# Patient Record
Sex: FEMALE | Race: WHITE | Employment: UNEMPLOYED | URBAN - METROPOLITAN AREA
[De-identification: names, ages, dates, MRNs, and addresses within clinical notes are randomized per-mention and may not be internally consistent; named-entity substitution may affect disease eponyms.]

---

## 2017-03-14 ENCOUNTER — HOSPITAL ENCOUNTER (OUTPATIENT)
Dept: LAB | Age: 32
Discharge: HOME | End: 2017-03-14
Payer: OTHER GOVERNMENT

## 2017-03-14 PROCEDURE — 87624 HPV HI-RISK TYP POOLED RSLT: CPT | Performed by: ADVANCED PRACTICE MIDWIFE

## 2017-03-14 PROCEDURE — 88175 CYTOPATH C/V AUTO FLUID REDO: CPT | Performed by: ADVANCED PRACTICE MIDWIFE

## 2017-05-31 ENCOUNTER — APPOINTMENT (OUTPATIENT)
Dept: GENERAL RADIOLOGY | Age: 32
End: 2017-05-31
Attending: EMERGENCY MEDICINE
Payer: OTHER GOVERNMENT

## 2017-05-31 ENCOUNTER — HOSPITAL ENCOUNTER (EMERGENCY)
Age: 32
Discharge: HOME | End: 2017-05-31
Attending: EMERGENCY MEDICINE | Admitting: EMERGENCY MEDICINE
Payer: OTHER GOVERNMENT

## 2017-05-31 VITALS
SYSTOLIC BLOOD PRESSURE: 113 MMHG | DIASTOLIC BLOOD PRESSURE: 77 MMHG | WEIGHT: 108 LBS | HEART RATE: 79 BPM | RESPIRATION RATE: 21 BRPM | TEMPERATURE: 99 F | HEIGHT: 68 IN | BODY MASS INDEX: 16.37 KG/M2 | OXYGEN SATURATION: 100 %

## 2017-05-31 DIAGNOSIS — R00.0 TACHYCARDIA: ICD-10-CM

## 2017-05-31 DIAGNOSIS — R50.81 FEVER IN OTHER DISEASES: ICD-10-CM

## 2017-05-31 DIAGNOSIS — N39.0 ACUTE UTI (URINARY TRACT INFECTION): Primary | ICD-10-CM

## 2017-05-31 LAB
ALBUMIN SERPL BCP-MCNC: 4.2 G/DL (ref 3.4–5)
ALBUMIN/GLOB SERPL: 1.3 {RATIO} (ref 0.8–1.7)
ALP SERPL-CCNC: 65 U/L (ref 45–117)
ALT SERPL-CCNC: 19 U/L (ref 13–56)
ANION GAP BLD CALC-SCNC: 8 MMOL/L (ref 3–18)
APPEARANCE UR: ABNORMAL
AST SERPL W P-5'-P-CCNC: 14 U/L (ref 15–37)
BACTERIA URNS QL MICRO: ABNORMAL /HPF
BASOPHILS # BLD AUTO: 0 K/UL (ref 0–0.06)
BASOPHILS # BLD: 0 % (ref 0–2)
BILIRUB SERPL-MCNC: 0.5 MG/DL (ref 0.2–1)
BILIRUB UR QL: NEGATIVE
BUN SERPL-MCNC: 9 MG/DL (ref 7–18)
BUN/CREAT SERPL: 12 (ref 12–20)
CALCIUM SERPL-MCNC: 9.1 MG/DL (ref 8.5–10.1)
CHLORIDE SERPL-SCNC: 105 MMOL/L (ref 100–108)
CO2 SERPL-SCNC: 25 MMOL/L (ref 21–32)
COLOR UR: YELLOW
CREAT SERPL-MCNC: 0.78 MG/DL (ref 0.6–1.3)
DIFFERENTIAL METHOD BLD: ABNORMAL
EOSINOPHIL # BLD: 0 K/UL (ref 0–0.4)
EOSINOPHIL NFR BLD: 0 % (ref 0–5)
EPITH CASTS URNS QL MICRO: ABNORMAL /LPF (ref 0–5)
ERYTHROCYTE [DISTWIDTH] IN BLOOD BY AUTOMATED COUNT: 13.4 % (ref 11.6–14.5)
GLOBULIN SER CALC-MCNC: 3.3 G/DL (ref 2–4)
GLUCOSE SERPL-MCNC: 88 MG/DL (ref 74–99)
GLUCOSE UR STRIP.AUTO-MCNC: NEGATIVE MG/DL
HCG UR QL: POSITIVE
HCT VFR BLD AUTO: 34.5 % (ref 35–45)
HGB BLD-MCNC: 11.2 G/DL (ref 12–16)
HGB UR QL STRIP: ABNORMAL
KETONES UR QL STRIP.AUTO: 15 MG/DL
LACTATE BLD-SCNC: 1.1 MMOL/L (ref 0.4–2)
LEUKOCYTE ESTERASE UR QL STRIP.AUTO: ABNORMAL
LYMPHOCYTES # BLD AUTO: 3 % (ref 21–52)
LYMPHOCYTES # BLD: 0.3 K/UL (ref 0.9–3.6)
MCH RBC QN AUTO: 29.9 PG (ref 24–34)
MCHC RBC AUTO-ENTMCNC: 32.5 G/DL (ref 31–37)
MCV RBC AUTO: 92 FL (ref 74–97)
MONOCYTES # BLD: 0.5 K/UL (ref 0.05–1.2)
MONOCYTES NFR BLD AUTO: 6 % (ref 3–10)
NEUTS SEG # BLD: 7.3 K/UL (ref 1.8–8)
NEUTS SEG NFR BLD AUTO: 91 % (ref 40–73)
NITRITE UR QL STRIP.AUTO: NEGATIVE
PH UR STRIP: 7.5 [PH] (ref 5–8)
PLATELET # BLD AUTO: 223 K/UL (ref 135–420)
PMV BLD AUTO: 12.4 FL (ref 9.2–11.8)
POTASSIUM SERPL-SCNC: 4.2 MMOL/L (ref 3.5–5.5)
PROT SERPL-MCNC: 7.5 G/DL (ref 6.4–8.2)
PROT UR STRIP-MCNC: NEGATIVE MG/DL
RBC # BLD AUTO: 3.75 M/UL (ref 4.2–5.3)
RBC #/AREA URNS HPF: ABNORMAL /HPF (ref 0–5)
SERVICE CMNT-IMP: NORMAL
SODIUM SERPL-SCNC: 138 MMOL/L (ref 136–145)
SP GR UR REFRACTOMETRY: 1.01 (ref 1–1.03)
UROBILINOGEN UR QL STRIP.AUTO: 0.2 EU/DL (ref 0.2–1)
WBC # BLD AUTO: 8 K/UL (ref 4.6–13.2)
WBC URNS QL MICRO: ABNORMAL /HPF (ref 0–4)
WET PREP GENITAL: NORMAL

## 2017-05-31 PROCEDURE — 74011250636 HC RX REV CODE- 250/636: Performed by: NURSE PRACTITIONER

## 2017-05-31 PROCEDURE — 80053 COMPREHEN METABOLIC PANEL: CPT | Performed by: EMERGENCY MEDICINE

## 2017-05-31 PROCEDURE — 96365 THER/PROPH/DIAG IV INF INIT: CPT

## 2017-05-31 PROCEDURE — 96376 TX/PRO/DX INJ SAME DRUG ADON: CPT

## 2017-05-31 PROCEDURE — 87086 URINE CULTURE/COLONY COUNT: CPT | Performed by: EMERGENCY MEDICINE

## 2017-05-31 PROCEDURE — 96375 TX/PRO/DX INJ NEW DRUG ADDON: CPT

## 2017-05-31 PROCEDURE — 87186 SC STD MICRODIL/AGAR DIL: CPT | Performed by: EMERGENCY MEDICINE

## 2017-05-31 PROCEDURE — 83605 ASSAY OF LACTIC ACID: CPT

## 2017-05-31 PROCEDURE — 81025 URINE PREGNANCY TEST: CPT | Performed by: PHYSICIAN ASSISTANT

## 2017-05-31 PROCEDURE — 85025 COMPLETE CBC W/AUTO DIFF WBC: CPT | Performed by: EMERGENCY MEDICINE

## 2017-05-31 PROCEDURE — 99285 EMERGENCY DEPT VISIT HI MDM: CPT

## 2017-05-31 PROCEDURE — 87077 CULTURE AEROBIC IDENTIFY: CPT | Performed by: EMERGENCY MEDICINE

## 2017-05-31 PROCEDURE — 81001 URINALYSIS AUTO W/SCOPE: CPT | Performed by: EMERGENCY MEDICINE

## 2017-05-31 PROCEDURE — 93005 ELECTROCARDIOGRAM TRACING: CPT

## 2017-05-31 PROCEDURE — 74011250636 HC RX REV CODE- 250/636: Performed by: PHYSICIAN ASSISTANT

## 2017-05-31 PROCEDURE — 87210 SMEAR WET MOUNT SALINE/INK: CPT | Performed by: PHYSICIAN ASSISTANT

## 2017-05-31 PROCEDURE — 74011250637 HC RX REV CODE- 250/637: Performed by: NURSE PRACTITIONER

## 2017-05-31 PROCEDURE — 74011000258 HC RX REV CODE- 258: Performed by: NURSE PRACTITIONER

## 2017-05-31 PROCEDURE — 87491 CHLMYD TRACH DNA AMP PROBE: CPT | Performed by: PHYSICIAN ASSISTANT

## 2017-05-31 PROCEDURE — 71010 XR CHEST PORT: CPT

## 2017-05-31 PROCEDURE — 87040 BLOOD CULTURE FOR BACTERIA: CPT | Performed by: EMERGENCY MEDICINE

## 2017-05-31 PROCEDURE — 96361 HYDRATE IV INFUSION ADD-ON: CPT

## 2017-05-31 PROCEDURE — 74011250637 HC RX REV CODE- 250/637: Performed by: PHYSICIAN ASSISTANT

## 2017-05-31 RX ORDER — SULFAMETHOXAZOLE AND TRIMETHOPRIM 800; 160 MG/1; MG/1
1 TABLET ORAL EVERY 12 HOURS
Status: DISCONTINUED | OUTPATIENT
Start: 2017-05-31 | End: 2017-06-01 | Stop reason: HOSPADM

## 2017-05-31 RX ORDER — SODIUM CHLORIDE 0.9 % (FLUSH) 0.9 %
5-10 SYRINGE (ML) INJECTION AS NEEDED
Status: DISCONTINUED | OUTPATIENT
Start: 2017-05-31 | End: 2017-06-01 | Stop reason: HOSPADM

## 2017-05-31 RX ORDER — ONDANSETRON 2 MG/ML
4 INJECTION INTRAMUSCULAR; INTRAVENOUS
Status: COMPLETED | OUTPATIENT
Start: 2017-05-31 | End: 2017-05-31

## 2017-05-31 RX ORDER — ACETAMINOPHEN 500 MG
1000 TABLET ORAL
Status: COMPLETED | OUTPATIENT
Start: 2017-05-31 | End: 2017-05-31

## 2017-05-31 RX ORDER — IBUPROFEN 600 MG/1
600 TABLET ORAL
Qty: 20 TAB | Refills: 0 | Status: SHIPPED | OUTPATIENT
Start: 2017-05-31 | End: 2017-12-26

## 2017-05-31 RX ORDER — SULFAMETHOXAZOLE AND TRIMETHOPRIM 800; 160 MG/1; MG/1
1 TABLET ORAL 2 TIMES DAILY
Qty: 20 TAB | Refills: 0 | Status: SHIPPED | OUTPATIENT
Start: 2017-05-31 | End: 2017-12-26

## 2017-05-31 RX ADMIN — CEFTRIAXONE 1 G: 1 INJECTION, POWDER, FOR SOLUTION INTRAMUSCULAR; INTRAVENOUS at 19:22

## 2017-05-31 RX ADMIN — ONDANSETRON 4 MG: 2 INJECTION INTRAMUSCULAR; INTRAVENOUS at 20:10

## 2017-05-31 RX ADMIN — ONDANSETRON 4 MG: 2 INJECTION INTRAMUSCULAR; INTRAVENOUS at 17:18

## 2017-05-31 RX ADMIN — SODIUM CHLORIDE 1470 ML: 900 INJECTION, SOLUTION INTRAVENOUS at 16:15

## 2017-05-31 RX ADMIN — SULFAMETHOXAZOLE AND TRIMETHOPRIM 1 TABLET: 800; 160 TABLET ORAL at 21:38

## 2017-05-31 RX ADMIN — ACETAMINOPHEN 1000 MG: 500 TABLET ORAL at 16:43

## 2017-05-31 NOTE — ED PROVIDER NOTES
Patient is a 29 y.o. female presenting with general illness and other event. The history is provided by the patient. Generalized Body Aches     Other     Danyd Dodge is a 29 y.o. female presents with c/o fever, feeling bad, that started yesterday. States had a miscarriage, on May 10th, had a recent tattoo placed over her pelvis. Denies n/v. States took  pills yesterday. History reviewed. No pertinent past medical history. History reviewed. No pertinent surgical history. History reviewed. No pertinent family history. Social History     Social History    Marital status: SINGLE     Spouse name: N/A    Number of children: N/A    Years of education: N/A     Occupational History    Not on file. Social History Main Topics    Smoking status: Never Smoker    Smokeless tobacco: Not on file    Alcohol use No    Drug use: Not on file    Sexual activity: Not on file     Other Topics Concern    Not on file     Social History Narrative    No narrative on file         ALLERGIES: Percocet [oxycodone-acetaminophen]    Review of Systems  Constitutional: malaise, fever, chills. Head:  Denies injury. Face:  Denies injury or pain. ENMT:  Denies sore throat. Neck:  Denies injury or pain. Chest:  Denies injury. Cardiac:  Denies chest pain or palpitations. Respiratory:  Denies cough, wheezing, difficulty breathing, shortness of breath. GI/ABD:  Denies injury, pain, distention, nausea, vomiting, diarrhea. :  Denies injury, pain, dysuria or urgency. Back:  Denies injury or pain. Pelvis:  Denies injury or pain. Extremity/MS:  Denies injury or pain. Neuro:  Denies headache, LOC, dizziness, neurologic symptoms/deficits/paresthesias. Skin: Denies injury, rash, itching or skin changes.     Vitals:    17 1558 17 1609   BP: 117/74 122/76   Pulse: (!) 139 (!) 111   Resp: 18 16   Temp: (!) 102.6 °F (39.2 °C) 100 °F (37.8 °C)   SpO2: 100% 100%   Weight: 49 kg (108 lb) Height: 5' 8\" (1.727 m)             Physical Exam   Nursing note and vitals reviewed. CONSTITUTIONAL: Alert, in no apparent distress; well-developed; well-nourished. HEAD:  Normocephalic, atraumatic. EYES: PERRL; EOM's intact. ENTM: Nose: No rhinorrhea; Throat: mucous membranes moist. Posterior pharynx-normal.  Neck:  No JVD, supple without lymphadenopathy. RESP: Chest clear, equal breath sounds. CV: S1 and S2 WNL; No murmurs, gallops or rubs. GI: Abdomen soft and non-tender. No masses or organomegaly. UPPER EXT:  Normal inspection. LOWER EXT: Normal inspection. Upper thigh with new tattoo that does not have any surrounding erythema, no visible signs of infection. NEURO: strength 5/5 and sym, sensation intact. SKIN: No rashes; Normal for age and stage. PSYCH:  Alert and oriented, normal affect. MDM  Number of Diagnoses or Management Options     Amount and/or Complexity of Data Reviewed  Clinical lab tests: ordered and reviewed  Tests in the medicine section of CPT®: ordered and reviewed      ED Course   Consulted with CYNTHIA Decker  concerning patient Mabel Green, standard discussion of reason for visit, HPI, ROS, PE, and current results available. Report was given at this time and pt was turned over to the provider above, who will assume care of pt at this time and disposition. SARI Pete       Pelvic Exam  Date/Time: 5/31/2017 5:55 PM  Performed by: PA  Exam assisted by:  nurse. Type of exam performed: bimanual and speculum. External genitalia appearance: normal.    Vaginal exam:  normal.    Cervical exam:  normal.    Specimen(s) collected:  GC. Bimanual exam:  normal.          CONSULT:  Discussed the Pt with Dr Jean Mcintosh, who went to the Pt's room and is with her now. Efrain Ayoub NP   9:39 PM    Consult:  Dr Jean Mcintosh advised the Pt can follow up with Dr Israel Bliss on Friday as planned. Efrain Ayoub NP   10:02 PM      Diagnosis:   1. Acute UTI (urinary tract infection)    2.  Fever in other diseases    3. Tachycardia          Disposition:   DISCHARGED TO HOME. Follow-up Information     None          Patient's Medications   Start Taking    IBUPROFEN (MOTRIN) 600 MG TABLET    Take 1 Tab by mouth every six (6) hours as needed for Pain. TRIMETHOPRIM-SULFAMETHOXAZOLE (BACTRIM DS, SEPTRA DS) 160-800 MG PER TABLET    Take 1 Tab by mouth two (2) times a day.    Continue Taking    No medications on file   These Medications have changed    No medications on file   Stop Taking    No medications on file

## 2017-05-31 NOTE — ED TRIAGE NOTES
Pt c/o generalized body aches and not feeling well for 2 days. The Sepsis Screening has been completed on arrival in the Emergency Department.     Vital signs:  Patient Vitals for the past 4 hrs:   Temp Pulse Resp BP SpO2   05/31/17 1558 (!) 102.6 °F (39.2 °C) (!) 139 18 117/74 100 %            =monitored (data validate)  MAP (Calculated): 88    =calculated (manual entry)    Is patient is 31y/o or older, meets 2 or more ABNORMAL VITAL SIGNS below, associated with SUSPECTED INFECTION?  YES     Temperature < 96.8°F (36°C) or > 100.9°F (38.3°C)   Heart Rate > 90 beats per minute   Respiratory Rate > 20 beats per minute   BP < 90 systolic    MAP < 65    IF ANSWER IS YES, CALL A CODE SEPSIS  POC LACTIC ACID ASAP AND BEGIN NURSE DRIVEN SEPSIS ORDERS WHILE AWAITING PROVIDER

## 2017-05-31 NOTE — ED NOTES
Vitals:    05/31/17 1609 05/31/17 1709 05/31/17 1715 05/31/17 1720   BP: 122/76 125/82 118/78    Pulse: (!) 111  (!) 107    Resp: 16  17    Temp: 100 °F (37.8 °C)   100 °F (37.8 °C)   SpO2: 100%  100%

## 2017-05-31 NOTE — ED NOTES
Purposeful rounding:    Side rails up x2: YES  Bed low and wheels are locked: YES  Call bell in reach: YES  Comfort addressed : YES  Toileting needs addressed: YES  Plan of care reviewed/updated with patient and family members: YES  IV site assessed and addressed: YES  Pain assessed and addressed: YES  Pain level:0  Patient complaining of nausea, Provider notified

## 2017-06-01 LAB
ATRIAL RATE: 117 BPM
C TRACH RRNA SPEC QL NAA+PROBE: NEGATIVE
CALCULATED P AXIS, ECG09: 76 DEGREES
CALCULATED R AXIS, ECG10: 59 DEGREES
CALCULATED T AXIS, ECG11: 30 DEGREES
DIAGNOSIS, 93000: NORMAL
N GONORRHOEA RRNA SPEC QL NAA+PROBE: NEGATIVE
P-R INTERVAL, ECG05: 148 MS
Q-T INTERVAL, ECG07: 316 MS
QRS DURATION, ECG06: 76 MS
QTC CALCULATION (BEZET), ECG08: 440 MS
SPECIMEN SOURCE: NORMAL
VENTRICULAR RATE, ECG03: 117 BPM

## 2017-06-01 NOTE — PROGRESS NOTES
Leland Weathers MD  [address]  [address]  [address] [facility]  [phone]     Labor and delivery screening visit  Phone Triage Visit    Name: Merline Revel MRN: [id number]  SSN: [id number]    YOB: 1985  Age: 29 y.o. Sex: female        Dwayne Scott is a 29 y.o. No obstetric history on file. female who is due Not found. . This makes her Unknown. She  is being seen for   Chief Complaint   Patient presents with    Generalized Body Aches    Other   Active Problems:    * No active hospital problems. *  ,   OB History   No data available       She is being seen in screening location ER09/09. Additional Diagnoses:Present on Admission:  **None**       Historical Additional  Problem List.:    Allergies   Allergen Reactions    Percocet [Oxycodone-Acetaminophen] Other (comments)     seizure     History reviewed. No pertinent past medical history.     Prior to Admission medications    Not on File        Objective:  Visit Vitals    /67    Pulse 87    Temp 99 °F (37.2 °C)    Resp 19    Ht 5' 8\" (1.727 m)    Wt 49 kg (108 lb)    SpO2 98%    BMI 16.42 kg/m2       Prenatal Labs:   No results found for: RUBELLAEXT, GRBSEXT, HBSAGEXT, HIVEXT, RPREXT, GONNOEXT, CHLAMEXT    WBC   Date/Time Value Ref Range Status   05/31/2017 04:03 PM 8.0 4.6 - 13.2 K/uL Final     HGB   Date/Time Value Ref Range Status   05/31/2017 04:03 PM 11.2 (L) 12.0 - 16.0 g/dL Final     PLATELET   Date/Time Value Ref Range Status   05/31/2017 04:03  135 - 420 K/uL Final       Labs:    Recent Results (from the past 24 hour(s))   URINALYSIS W/ RFLX MICROSCOPIC    Collection Time: 05/31/17  4:00 PM   Result Value Ref Range    Color YELLOW      Appearance CLOUDY      Specific gravity 1.008 1.005 - 1.030      pH (UA) 7.5 5.0 - 8.0      Protein NEGATIVE  NEG mg/dL    Glucose NEGATIVE  NEG mg/dL    Ketone 15 (A) NEG mg/dL    Bilirubin NEGATIVE  NEG      Blood LARGE (A) NEG      Urobilinogen 0.2 0.2 - 1.0 EU/dL    Nitrites NEGATIVE  NEG      Leukocyte Esterase LARGE (A) NEG     HCG URINE, QL    Collection Time: 05/31/17  4:00 PM   Result Value Ref Range    HCG urine, Ql. POSITIVE (A) NEG     URINE MICROSCOPIC ONLY    Collection Time: 05/31/17  4:00 PM   Result Value Ref Range    WBC TOO NUMEROUS TO COUNT 0 - 4 /hpf    RBC 4 to 6 0 - 5 /hpf    Epithelial cells 1+ 0 - 5 /lpf    Bacteria 1+ (A) NEG /hpf   METABOLIC PANEL, COMPREHENSIVE    Collection Time: 05/31/17  4:03 PM   Result Value Ref Range    Sodium 138 136 - 145 mmol/L    Potassium 4.2 3.5 - 5.5 mmol/L    Chloride 105 100 - 108 mmol/L    CO2 25 21 - 32 mmol/L    Anion gap 8 3.0 - 18 mmol/L    Glucose 88 74 - 99 mg/dL    BUN 9 7.0 - 18 MG/DL    Creatinine 0.78 0.6 - 1.3 MG/DL    BUN/Creatinine ratio 12 12 - 20      GFR est AA >60 >60 ml/min/1.73m2    GFR est non-AA >60 >60 ml/min/1.73m2    Calcium 9.1 8.5 - 10.1 MG/DL    Bilirubin, total 0.5 0.2 - 1.0 MG/DL    ALT (SGPT) 19 13 - 56 U/L    AST (SGOT) 14 (L) 15 - 37 U/L    Alk. phosphatase 65 45 - 117 U/L    Protein, total 7.5 6.4 - 8.2 g/dL    Albumin 4.2 3.4 - 5.0 g/dL    Globulin 3.3 2.0 - 4.0 g/dL    A-G Ratio 1.3 0.8 - 1.7     CBC WITH AUTOMATED DIFF    Collection Time: 05/31/17  4:03 PM   Result Value Ref Range    WBC 8.0 4.6 - 13.2 K/uL    RBC 3.75 (L) 4.20 - 5.30 M/uL    HGB 11.2 (L) 12.0 - 16.0 g/dL    HCT 34.5 (L) 35.0 - 45.0 %    MCV 92.0 74.0 - 97.0 FL    MCH 29.9 24.0 - 34.0 PG    MCHC 32.5 31.0 - 37.0 g/dL    RDW 13.4 11.6 - 14.5 %    PLATELET 857 815 - 220 K/uL    MPV 12.4 (H) 9.2 - 11.8 FL    NEUTROPHILS 91 (H) 40 - 73 %    LYMPHOCYTES 3 (L) 21 - 52 %    MONOCYTES 6 3 - 10 %    EOSINOPHILS 0 0 - 5 %    BASOPHILS 0 0 - 2 %    ABS. NEUTROPHILS 7.3 1.8 - 8.0 K/UL    ABS. LYMPHOCYTES 0.3 (L) 0.9 - 3.6 K/UL    ABS. MONOCYTES 0.5 0.05 - 1.2 K/UL    ABS. EOSINOPHILS 0.0 0.0 - 0.4 K/UL    ABS.  BASOPHILS 0.0 0.0 - 0.06 K/UL    DF AUTOMATED     EKG, 12 LEAD, INITIAL    Collection Time: 05/31/17  4:05 PM Result Value Ref Range    Ventricular Rate 117 BPM    Atrial Rate 117 BPM    P-R Interval 148 ms    QRS Duration 76 ms    Q-T Interval 316 ms    QTC Calculation (Bezet) 440 ms    Calculated P Axis 76 degrees    Calculated R Axis 59 degrees    Calculated T Axis 30 degrees    Diagnosis       Sinus tachycardia  Possible Left atrial enlargement  Nonspecific T wave abnormality  Abnormal ECG  No previous ECGs available     POC LACTIC ACID    Collection Time: 05/31/17  4:21 PM   Result Value Ref Range    Lactic Acid (POC) 1.1 0.4 - 2.0 mmol/L   WET PREP    Collection Time: 05/31/17  5:50 PM   Result Value Ref Range    Special Requests: NO SPECIAL REQUESTS      Wet prep NO YEAST,TRICHOMONAS OR CLUE CELLS NOTED              Estimated Date of Delivery: None noted. OB History     No data available              Physical Exam: Per nursing assessment  Patient without distress  HEENT: No obvious head trauma, she can hear at a conversational level, vision is adequate, and no choking or difficulty swallowing. Neurologically: She oriented to time and place as well as understands her situation and give a good medical history. Chest: clear to auscultation and percussion  Abdomen: benign with no obvious gallbladder or appendicits type pain, no CVA tenderness and the fundal size is consistent with her dates within four centimeters. Pelvic: External genitalia normal without inflammation, lesions or abnormal discharge  Extremities: with some swelling but not hyperreflexia  CervicalExam: / / /   No real tenderness, no foul discharge on my exam, seems to have passed and given warnings if not good in am call for appt but seen in 48 hrs in any case           Impression/Plan:     Plan:  *No real tenderness, no foul discharge on my exam, seems to have passed and given warnings if not good in am call for appt but seen in 48 hrs in any case**.      Seein Dr Tracie Lugo in 2 days but for now seems UTI, no US done now by ER but does not seem septic                                                        Signed By:  Ambika Guo MD     May 31, 2017

## 2017-06-01 NOTE — ED NOTES
Patient states she was using [facility] and the providers that she saw were Dr. Alison Andrade, Dr. Hattie Way and the midwife Rony Torres.

## 2017-06-01 NOTE — ED NOTES
Patient discharged home, A&Ox4, no apparent distress. Patient verbalizes understanding of discharge instructions, medications and follow up.

## 2017-06-01 NOTE — DISCHARGE INSTRUCTIONS
Learning About Fever  What is a fever? A fever is a high body temperature. It's one way your body fights being sick. A fever shows that the body is responding to infection or other illnesses, both minor and severe. A fever is a symptom, not an illness by itself. A fever can be a sign that you are ill, but most fevers are not caused by a serious problem. You may have a fever with a minor illness, such as a cold. But sometimes a very serious infection may cause little or no fever. It is important to look at other symptoms, other conditions you have, and how you feel in general. In children, notice how they act and see what symptoms they complain of. What is a normal body temperature? A normal body temperature is about 98. 6ºF. Some people have a normal temperature that is a little higher or a little lower than this. Your temperature may be a little lower in the morning than it is later in the day. It may go up during hot weather or when you exercise, wear heavy clothes, or take a hot bath. Your temperature may also be different depending on how you take it. A temperature taken in the mouth (oral) or under the arm may be a little lower than your core temperature (rectal). What is a fever temperature? A core temperature of 100.4°F or above is considered a fever. What can cause a fever? A fever may be caused by:  · Infections. This is the most common cause of a fever. Examples of infections that can cause a fever include the flu, a kidney infection, or pneumonia. · Some medicines. · Severe trauma or injury, such as a heart attack, stroke, heatstroke, or burns. · Other medical conditions, such as arthritis and some cancers. How can you treat a fever at home? · Ask your doctor if you can take an over-the-counter pain medicine, such as acetaminophen (Tylenol), ibuprofen (Advil, Motrin), or naproxen (Aleve). Be safe with medicines. Read and follow all instructions on the label.   · To prevent dehydration, drink plenty of fluids. Choose water and other caffeine-free clear liquids until you feel better. If you have kidney, heart, or liver disease and have to limit fluids, talk with your doctor before you increase the amount of fluids you drink. Follow-up care is a key part of your treatment and safety. Be sure to make and go to all appointments, and call your doctor if you are having problems. It's also a good idea to know your test results and keep a list of the medicines you take. Where can you learn more? Go to http://georges-omero.info/. Enter N609 in the search box to learn more about \"Learning About Fever. \"  Current as of: May 27, 2016  Content Version: 11.2  © 0683-5315 [employer]. Care instructions adapted under license by [employer] (which disclaims liability or warranty for this information). If you have questions about a medical condition or this instruction, always ask your healthcare professional. [address] any warranty or liability for your use of this information. Easy [employer] Activation    Thank you for requesting access to [employer]. Please follow the instructions below to securely access and download your online medical record. [employer] allows you to send messages to your doctor, view your test results, renew your prescriptions, schedule appointments, and more. How Do I Sign Up? 1. In your internet browser, go to [email or web address]  2. Click on the First Time User? Click Here link in the Sign In box. You will be redirect to the New Member Sign Up page. 3. Enter your [employer] Access Code exactly as it appears below. You will not need to use this code after youve completed the sign-up process. If you do not sign up before the expiration date, you must request a new code. [employer] Access Code: [email or web address]  Expires: 2017 10:15 PM (This is the date your [employer] access code will )    4.  Enter the last four digits of your Social Security Number (xxxx) and Date of Birth (mm/dd/yyyy) as indicated and click Submit. You will be taken to the next sign-up page. 5. Create a [employer] ID. This will be your [employer] login ID and cannot be changed, so think of one that is secure and easy to remember. 6. Create a [employer] password. You can change your password at any time. 7. Enter your Password Reset Question and Answer. This can be used at a later time if you forget your password. 8. Enter your e-mail address. You will receive e-mail notification when new information is available in [address] 9. Click Sign Up. You can now view and download portions of your medical record. 10. Click the Download Summary menu link to download a portable copy of your medical information. Additional Information    If you have questions, please visit the Frequently Asked Questions section of the [employer] website at [email or web address] Remember, [employer] is NOT to be used for urgent needs. For medical emergencies, dial 911.

## 2017-06-01 NOTE — ED NOTES
Received a call from the lab. Patient has positive blood cultures. Patient called at home. She states that she's feeling better. Temp 98. She was encourage to follow up with Dr. Maurice Truong as schedule tomorrow. Urine culture was ordered. 12:56 PM - Case discussed with Dr. Bobby Rome. He states that the patient is no longer pregnant. Will change her to Levaquin.

## 2017-06-02 ENCOUNTER — HOSPITAL ENCOUNTER (OUTPATIENT)
Age: 32
Setting detail: OUTPATIENT SURGERY
Discharge: HOME | End: 2017-06-02
Attending: OBSTETRICS & GYNECOLOGY | Admitting: OBSTETRICS & GYNECOLOGY
Payer: OTHER GOVERNMENT

## 2017-06-02 ENCOUNTER — ANESTHESIA (OUTPATIENT)
Dept: SURGERY | Age: 32
End: 2017-06-02
Payer: OTHER GOVERNMENT

## 2017-06-02 ENCOUNTER — ANESTHESIA EVENT (OUTPATIENT)
Dept: SURGERY | Age: 32
End: 2017-06-02
Payer: OTHER GOVERNMENT

## 2017-06-02 VITALS
HEART RATE: 72 BPM | WEIGHT: 105.44 LBS | RESPIRATION RATE: 16 BRPM | DIASTOLIC BLOOD PRESSURE: 61 MMHG | TEMPERATURE: 98.4 F | SYSTOLIC BLOOD PRESSURE: 126 MMHG | BODY MASS INDEX: 15.98 KG/M2 | OXYGEN SATURATION: 100 % | HEIGHT: 68 IN

## 2017-06-02 LAB
ABO + RH BLD: NORMAL
BASOPHILS # BLD AUTO: 0 K/UL (ref 0–0.06)
BASOPHILS # BLD: 1 % (ref 0–2)
BLOOD GROUP ANTIBODIES SERPL: NORMAL
DIFFERENTIAL METHOD BLD: ABNORMAL
EOSINOPHIL # BLD: 0.1 K/UL (ref 0–0.4)
EOSINOPHIL NFR BLD: 2 % (ref 0–5)
ERYTHROCYTE [DISTWIDTH] IN BLOOD BY AUTOMATED COUNT: 13.4 % (ref 11.6–14.5)
HCT VFR BLD AUTO: 34.8 % (ref 35–45)
HGB BLD-MCNC: 10.9 G/DL (ref 12–16)
LYMPHOCYTES # BLD AUTO: 21 % (ref 21–52)
LYMPHOCYTES # BLD: 0.9 K/UL (ref 0.9–3.6)
MCH RBC QN AUTO: 29.2 PG (ref 24–34)
MCHC RBC AUTO-ENTMCNC: 31.3 G/DL (ref 31–37)
MCV RBC AUTO: 93.3 FL (ref 74–97)
MONOCYTES # BLD: 0.6 K/UL (ref 0.05–1.2)
MONOCYTES NFR BLD AUTO: 15 % (ref 3–10)
NEUTS SEG # BLD: 2.6 K/UL (ref 1.8–8)
NEUTS SEG NFR BLD AUTO: 61 % (ref 40–73)
PLATELET # BLD AUTO: 218 K/UL (ref 135–420)
PMV BLD AUTO: 12.7 FL (ref 9.2–11.8)
RBC # BLD AUTO: 3.73 M/UL (ref 4.2–5.3)
SPECIMEN EXP DATE BLD: NORMAL
WBC # BLD AUTO: 4.1 K/UL (ref 4.6–13.2)

## 2017-06-02 PROCEDURE — 85025 COMPLETE CBC W/AUTO DIFF WBC: CPT | Performed by: OBSTETRICS & GYNECOLOGY

## 2017-06-02 PROCEDURE — 77030012510 HC MSK AIRWY LMA TELE -B: Performed by: ANESTHESIOLOGY

## 2017-06-02 PROCEDURE — 77030003666 HC NDL SPINAL BD -A: Performed by: OBSTETRICS & GYNECOLOGY

## 2017-06-02 PROCEDURE — 86900 BLOOD TYPING SEROLOGIC ABO: CPT | Performed by: OBSTETRICS & GYNECOLOGY

## 2017-06-02 PROCEDURE — 77030020164: Performed by: OBSTETRICS & GYNECOLOGY

## 2017-06-02 PROCEDURE — 77030008577 HC TBNG UTER SUC OCOA -A: Performed by: OBSTETRICS & GYNECOLOGY

## 2017-06-02 PROCEDURE — 77030032490 HC SLV COMPR SCD KNE COVD -B: Performed by: OBSTETRICS & GYNECOLOGY

## 2017-06-02 PROCEDURE — 74011250636 HC RX REV CODE- 250/636

## 2017-06-02 PROCEDURE — 76060000032 HC ANESTHESIA 0.5 TO 1 HR: Performed by: OBSTETRICS & GYNECOLOGY

## 2017-06-02 PROCEDURE — 76210000021 HC REC RM PH II 0.5 TO 1 HR: Performed by: OBSTETRICS & GYNECOLOGY

## 2017-06-02 PROCEDURE — 74011000250 HC RX REV CODE- 250

## 2017-06-02 PROCEDURE — 76010000138 HC OR TIME 0.5 TO 1 HR: Performed by: OBSTETRICS & GYNECOLOGY

## 2017-06-02 PROCEDURE — 76210000006 HC OR PH I REC 0.5 TO 1 HR: Performed by: OBSTETRICS & GYNECOLOGY

## 2017-06-02 PROCEDURE — 88305 TISSUE EXAM BY PATHOLOGIST: CPT | Performed by: OBSTETRICS & GYNECOLOGY

## 2017-06-02 PROCEDURE — 77030018842 HC SOL IRR SOD CL 9% BAXT -A: Performed by: OBSTETRICS & GYNECOLOGY

## 2017-06-02 RX ORDER — ONDANSETRON 4 MG/1
4 TABLET, FILM COATED ORAL
COMMUNITY
End: 2017-12-26

## 2017-06-02 RX ORDER — SODIUM CHLORIDE 0.9 % (FLUSH) 0.9 %
5-10 SYRINGE (ML) INJECTION AS NEEDED
Status: DISCONTINUED | OUTPATIENT
Start: 2017-06-02 | End: 2017-06-02 | Stop reason: HOSPADM

## 2017-06-02 RX ORDER — MIDAZOLAM HYDROCHLORIDE 1 MG/ML
INJECTION, SOLUTION INTRAMUSCULAR; INTRAVENOUS AS NEEDED
Status: DISCONTINUED | OUTPATIENT
Start: 2017-06-02 | End: 2017-06-02 | Stop reason: HOSPADM

## 2017-06-02 RX ORDER — ONDANSETRON 2 MG/ML
4 INJECTION INTRAMUSCULAR; INTRAVENOUS ONCE
Status: DISCONTINUED | OUTPATIENT
Start: 2017-06-02 | End: 2017-06-02 | Stop reason: HOSPADM

## 2017-06-02 RX ORDER — FENTANYL CITRATE 50 UG/ML
INJECTION, SOLUTION INTRAMUSCULAR; INTRAVENOUS AS NEEDED
Status: DISCONTINUED | OUTPATIENT
Start: 2017-06-02 | End: 2017-06-02 | Stop reason: HOSPADM

## 2017-06-02 RX ORDER — ACETAMINOPHEN 325 MG/1
TABLET ORAL
COMMUNITY
End: 2017-12-26

## 2017-06-02 RX ORDER — PROPOFOL 10 MG/ML
INJECTION, EMULSION INTRAVENOUS AS NEEDED
Status: DISCONTINUED | OUTPATIENT
Start: 2017-06-02 | End: 2017-06-02 | Stop reason: HOSPADM

## 2017-06-02 RX ORDER — KETOROLAC TROMETHAMINE 30 MG/ML
INJECTION, SOLUTION INTRAMUSCULAR; INTRAVENOUS AS NEEDED
Status: DISCONTINUED | OUTPATIENT
Start: 2017-06-02 | End: 2017-06-02 | Stop reason: HOSPADM

## 2017-06-02 RX ORDER — SODIUM CHLORIDE, SODIUM LACTATE, POTASSIUM CHLORIDE, CALCIUM CHLORIDE 600; 310; 30; 20 MG/100ML; MG/100ML; MG/100ML; MG/100ML
INJECTION, SOLUTION INTRAVENOUS
Status: DISCONTINUED | OUTPATIENT
Start: 2017-06-02 | End: 2017-06-02 | Stop reason: HOSPADM

## 2017-06-02 RX ORDER — LIDOCAINE HYDROCHLORIDE 20 MG/ML
INJECTION, SOLUTION EPIDURAL; INFILTRATION; INTRACAUDAL; PERINEURAL AS NEEDED
Status: DISCONTINUED | OUTPATIENT
Start: 2017-06-02 | End: 2017-06-02 | Stop reason: HOSPADM

## 2017-06-02 RX ORDER — FENTANYL CITRATE 50 UG/ML
50 INJECTION, SOLUTION INTRAMUSCULAR; INTRAVENOUS
Status: DISCONTINUED | OUTPATIENT
Start: 2017-06-02 | End: 2017-06-02 | Stop reason: HOSPADM

## 2017-06-02 RX ORDER — FENTANYL CITRATE 50 UG/ML
25 INJECTION, SOLUTION INTRAMUSCULAR; INTRAVENOUS AS NEEDED
Status: DISCONTINUED | OUTPATIENT
Start: 2017-06-02 | End: 2017-06-02 | Stop reason: HOSPADM

## 2017-06-02 RX ORDER — ONDANSETRON 2 MG/ML
INJECTION INTRAMUSCULAR; INTRAVENOUS AS NEEDED
Status: DISCONTINUED | OUTPATIENT
Start: 2017-06-02 | End: 2017-06-02 | Stop reason: HOSPADM

## 2017-06-02 RX ORDER — DEXAMETHASONE SODIUM PHOSPHATE 4 MG/ML
INJECTION, SOLUTION INTRA-ARTICULAR; INTRALESIONAL; INTRAMUSCULAR; INTRAVENOUS; SOFT TISSUE AS NEEDED
Status: DISCONTINUED | OUTPATIENT
Start: 2017-06-02 | End: 2017-06-02 | Stop reason: HOSPADM

## 2017-06-02 RX ORDER — LEVOFLOXACIN 250 MG/1
TABLET ORAL DAILY
COMMUNITY
End: 2017-12-26

## 2017-06-02 RX ORDER — SODIUM CHLORIDE, SODIUM LACTATE, POTASSIUM CHLORIDE, CALCIUM CHLORIDE 600; 310; 30; 20 MG/100ML; MG/100ML; MG/100ML; MG/100ML
50 INJECTION, SOLUTION INTRAVENOUS CONTINUOUS
Status: DISCONTINUED | OUTPATIENT
Start: 2017-06-02 | End: 2017-06-02 | Stop reason: HOSPADM

## 2017-06-02 RX ADMIN — MIDAZOLAM HYDROCHLORIDE 2 MG: 1 INJECTION, SOLUTION INTRAMUSCULAR; INTRAVENOUS at 13:25

## 2017-06-02 RX ADMIN — SODIUM CHLORIDE, SODIUM LACTATE, POTASSIUM CHLORIDE, CALCIUM CHLORIDE: 600; 310; 30; 20 INJECTION, SOLUTION INTRAVENOUS at 13:22

## 2017-06-02 RX ADMIN — ONDANSETRON 4 MG: 2 INJECTION INTRAMUSCULAR; INTRAVENOUS at 13:38

## 2017-06-02 RX ADMIN — KETOROLAC TROMETHAMINE 30 MG: 30 INJECTION, SOLUTION INTRAMUSCULAR; INTRAVENOUS at 14:02

## 2017-06-02 RX ADMIN — LIDOCAINE HYDROCHLORIDE 60 MG: 20 INJECTION, SOLUTION EPIDURAL; INFILTRATION; INTRACAUDAL; PERINEURAL at 13:31

## 2017-06-02 RX ADMIN — PROPOFOL 200 MG: 10 INJECTION, EMULSION INTRAVENOUS at 13:31

## 2017-06-02 RX ADMIN — DEXAMETHASONE SODIUM PHOSPHATE 4 MG: 4 INJECTION, SOLUTION INTRA-ARTICULAR; INTRALESIONAL; INTRAMUSCULAR; INTRAVENOUS; SOFT TISSUE at 13:35

## 2017-06-02 RX ADMIN — FENTANYL CITRATE 50 MCG: 50 INJECTION, SOLUTION INTRAMUSCULAR; INTRAVENOUS at 13:31

## 2017-06-02 RX ADMIN — FENTANYL CITRATE 50 MCG: 50 INJECTION, SOLUTION INTRAMUSCULAR; INTRAVENOUS at 13:37

## 2017-06-02 NOTE — OP NOTES
OPERATIVE REPORT      PREOP DIAGNOSIS: Incomplete   POSTOP DIAGNOSIS: Same  PROCEDURE: Suction D&C  SURGEON: Song Norman MD  ASSISTANT: none  EBL: minimal cc  IVFluids:500cc  ANESTHESIA: general anesthesia  FINDINGS: 9wk uterus with products of conception  DISPOSITION: stable to recovery    PROCEDURE:   Patient was taken to the OR after informed consent had been obtained. Surgery identification was completed with the patient and the OR team. She was then placed in the dorsal lithotomy position. She was prepped and draped  in a sterile fashion. A urethral catheter was used to drain the bladder. A speculum was placed in the posterior vagina. The anterior lip of the cervix was grasped with an Allis. The uterus was sounded to 9 cm. The cervix was serially dilated with Lyla dilators. A n 8mm suction curette was advanced into the uterine cavity and all products of conception removed. Sharp curettage was performed. The suction curette was then advanced again into the uterus and all remaining clot and debris removed. The Allis was removed. There was good hemostasis.  At the end of the procedure all sponge, lap, needle and instrument counts were correct x 2.       Song Norman MD  2017

## 2017-06-02 NOTE — ANESTHESIA POSTPROCEDURE EVALUATION
Post-Anesthesia Evaluation and Assessment    Patient: Maria M Amato MRN: [id number]  SSN: [id number]    YOB: 1985  Age: 29 y.o. Sex: female       Cardiovascular Function/Vital Signs  Visit Vitals    /63    Pulse 65    Temp 36.9 °C (98.4 °F)    Resp 17    Ht 5' 8\" (1.727 m)    Wt 47.8 kg (105 lb 7 oz)    SpO2 100%    BMI 16.03 kg/m2       Patient is status post general anesthesia for Procedure(s):  DILATATION AND CURETTAGE WITH SUCTION. Nausea/Vomiting: None    Postoperative hydration reviewed and adequate. Pain:  Pain Scale 1: Numeric (0 - 10) (06/02/17 1439)  Pain Intensity 1: 0 (06/02/17 1439)   Managed    Neurological Status:   Neuro (WDL): Within Defined Limits (06/02/17 1439)   At baseline    Mental Status and Level of Consciousness: Arousable    Pulmonary Status:   O2 Device: Room air (06/02/17 1439)   Adequate oxygenation and airway patent    Complications related to anesthesia: None    Post-anesthesia assessment completed.  No concerns    Signed By: Cintia Brizuela MD     June 2, 2017

## 2017-06-02 NOTE — IP AVS SNAPSHOT
Summary of Care Report The Summary of Care report has been created to help improve care coordination. Users with access to [employer] or [address] (Web-based application) may access additional patient information including the Discharge Summary. If you are not currently a [address] user and need more information, please call the number listed below in the [place] [address] section and ask to be connected with Medical Records. Facility Information Name Address Phone [facility] [address] [facility] [address] [id number] [phone] Patient Information Patient Name Sex FERNANDA Rosales ([id number]) Female 1985 Discharge Information Admitting Provider Service Area Unit Terri Adamson MD / [address] Phase 2 Recovery / [phone] Discharge Provider Discharge Date/Time Discharge Disposition Destination (none) 2017 Afternoon (Pending) AHR (none) Patient Language Language ENGLISH [13] You are allergic to the following Allergen Reactions Percocet (Oxycodone-Acetaminophen) Other (comments)  
 seizure Current Discharge Medication List  
  
CONTINUE these medications which have NOT CHANGED Dose & Instructions Dispensing Information Comments  
 ibuprofen 600 mg tablet Commonly known as:  MOTRIN Dose:  600 mg Take 1 Tab by mouth every six (6) hours as needed for Pain. Quantity:  20 Tab Refills:  0 LEVAQUIN 250 mg tablet Generic drug:  levoFLOXacin Take  by mouth daily. Refills:  0  
   
 trimethoprim-sulfamethoxazole 160-800 mg per tablet Commonly known as:  BACTRIM DS, SEPTRA DS Dose:  1 Tab Take 1 Tab by mouth two (2) times a day. Quantity:  20 Tab Refills:  0  
   
 TYLENOL 325 mg tablet Generic drug:  acetaminophen Take  by mouth every four (4) hours as needed for Pain. Refills:  0 ZOFRAN (AS HYDROCHLORIDE) 4 mg tablet Generic drug:  ondansetron hcl Dose:  4 mg Take 4 mg by mouth every eight (8) hours as needed for Nausea. Refills:  0 Surgery Information ID Date/Time Status Primary Surgeon All Procedures Location [id number] 6/2/2017 1404 [facility], MD DILATATION AND CURETTAGE WITH SUCTION [facility] MAIN OR Follow-up Information Follow up With Details Comments Contact Info Provider Unknown   Patient not available to ask Discharge Instructions Dilation and Curettage: What to Expect at [facility] Your Recovery Dilation and curettage (D&C) is a procedure to remove tissue from the inside of the uterus. The doctor used a curved tool, called a curette, to gently scrape tissue from your uterus. You are likely to have a backache, or cramps similar to menstrual cramps, and pass small clots of blood from your vagina for the first few days. You may continue to have light vaginal bleeding for several weeks after the procedure. You will probably be able to go back to most of your normal activities in 1 or 2 days. This care sheet gives you a general idea about how long it will take for you to recover. But each person recovers at a different pace. Follow the steps below to get better as quickly as possible. How can you care for yourself at home? Activity · Rest when you feel tired. Getting enough sleep will help you recover. · Avoid strenuous activities, such as bicycle riding, jogging, weight lifting, or aerobic exercise, until your doctor says it is okay. · Most women are able to return to work the day after the procedure. · You may have some light vaginal bleeding. Wear sanitary pads if needed. Do not douche or use tampons for 2 weeks or until your doctor says it is okay. · Ask your doctor when it is okay for you to have sex. · If you could become pregnant, talk about birth control with your doctor. Do not try to become pregnant until your doctor says it is okay. Diet · You can eat your normal diet. If your stomach is upset, try bland, low-fat foods like plain rice, broiled chicken, toast, and yogurt. · Drink plenty of fluids (unless your doctor tells you not to). Medicines · Your doctor will tell you if and when you can restart your medicines. He or she will also give you instructions about taking any new medicines. · If you take blood thinners, such as warfarin (Coumadin), clopidogrel (Plavix), or aspirin, be sure to talk to your doctor. He or she will tell you if and when to start taking those medicines again. Make sure that you understand exactly what your doctor wants you to do. · Be safe with medicines. Take pain medicines exactly as directed. ¨ If the doctor gave you a prescription medicine for pain, take it as prescribed. ¨ If you are not taking a prescription pain medicine, ask your doctor if you can take an over-the-counter medicine. · If you think your pain medicine is making you sick to your stomach: 
¨ Take your medicine after meals (unless your doctor has told you not to). ¨ Ask your doctor for a different pain medicine. · If your doctor prescribed antibiotics, take them as directed. Do not stop taking them just because you feel better. You need to take the full course of antibiotics. Follow-up care is a key part of your treatment and safety. Be sure to make and go to all appointments, and call your doctor if you are having problems. It's also a good idea to know your test results and keep a list of the medicines you take. When should you call for help? Call 911 anytime you think you may need emergency care. For example, call if: 
· You passed out (lost consciousness). · You have severe trouble breathing. · You have chest pain and shortness of breath, or you cough up blood. · You have severe pain in your belly. Call your doctor now or seek immediate medical care if: 
· You have bright red vaginal bleeding that soaks one or more pads each hour for 2 or more hours. · You pass blood clots that are larger than a golf ball. · You have vaginal discharge that smells bad. · You are sick to your stomach or cannot keep fluids down. · You have pain that does not get better after you take pain medicine. · You have pain that is getting worse 2 days after the procedure. · You have a fever over 100°F. 
· Your belly feels tender, or full and hard. Watch closely for changes in your health, and be sure to contact your doctor if: 
· You do not get better as expected. Where can you learn more? Go to http://georges-omero.info/. Enter [phone] in the search box to learn more about \"Dilation and Curettage: What to Expect at Home. \" Current as of: May 30, 2016 Content Version: 11.2 © 7090-0257 [employer]. Care instructions adapted under license by [employer] (which disclaims liability or warranty for this information). If you have questions about a medical condition or this instruction, always ask your healthcare professional. [place] [address] any warranty or liability for your use of this information. DISCHARGE SUMMARY from Nurse The following personal items are in your possession at time of discharge: 
 
Dental Appliances: None Visual Aid: None Home Medications: None Jewelry: Ring, Bracelet, Earrings Clothing: Footwear, Slippers, Pants, Undergarments Other Valuables: Awais Spencer Cell Phone Personal Items Sent to Safe: in closet PATIENT INSTRUCTIONS: 
 
After general anesthesia or intravenous sedation, for 24 hours or while taking prescription Narcotics: · Limit your activities · Do not drive and operate hazardous machinery · Do not make important personal or business decisions · Do  not drink alcoholic beverages · If you have not urinated within 8 hours after discharge, please contact your surgeon on call. Report the following to your surgeon: 
· Excessive pain, swelling, redness or odor of or around the surgical area · Temperature over 100.5 · Nausea and vomiting lasting longer than 4 hours or if unable to take medications · Any signs of decreased circulation or nerve impairment to extremity: change in color, persistent  numbness, tingling, coldness or increase pain · Any questions What to do at Home: 
Recommended activity: Activity as tolerated and no driving for today. These are general instructions for a healthy lifestyle: No smoking/ No tobacco products/ Avoid exposure to second hand smoke Surgeon General's Warning:  Quitting smoking now greatly reduces serious risk to your health. Obesity, smoking, and sedentary lifestyle greatly increases your risk for illness A healthy diet, regular physical exercise & weight monitoring are important for maintaining a healthy lifestyle You may be retaining fluid if you have a history of heart failure or if you experience any of the following symptoms:  Weight gain of 3 pounds or more overnight or 5 pounds in a week, increased swelling in our hands or feet or shortness of breath while lying flat in bed. Please call your doctor as soon as you notice any of these symptoms; do not wait until your next office visit. Recognize signs and symptoms of STROKE: 
 
F-face looks uneven A-arms unable to move or move unevenly S-speech slurred or non-existent T-time-call 911 as soon as signs and symptoms begin-DO NOT go Back to bed or wait to see if you get better-TIME IS BRAIN. Warning Signs of HEART ATTACK Call 911 if you have these symptoms: 
? Chest discomfort.  Most heart attacks involve discomfort in the center of the chest that lasts more than a few minutes, or that goes away and comes back. It can feel like uncomfortable pressure, squeezing, fullness, or pain. ? Discomfort in other areas of the upper body. Symptoms can include pain or discomfort in one or both arms, the back, neck, jaw, or stomach. ? Shortness of breath with or without chest discomfort. ? Other signs may include breaking out in a cold sweat, nausea, or lightheadedness. Don't wait more than five minutes to call [address]! Fast action can save your life. Calling 911 is almost always the fastest way to get lifesaving treatment. Emergency Medical Services staff can begin treatment when they arrive  up to an hour sooner than if someone gets to the hospital by car. The discharge information has been reviewed with the patient. The patient verbalized understanding. Discharge medications reviewed with the patient and appropriate educational materials and side effects teaching were provided. Patient armband removed and given to patient to take home. Patient was informed of the privacy risks if armband lost or stolen Chart Review Routing History No Routing History on File

## 2017-06-02 NOTE — H&P
Gynecology History and Physical    Name: Jomar Gonzales MRN: [id number] SSN: [id number]    YOB: 1985  Age: 29 y.o. Sex: female       Subjective:       [address] is a 32yo  with incomplete  and here for D&C. Saw Dr Hattie Way last week and missed ab confirmed. Took cytotec and had some cramping and bleeding. Presented to [facility] Wednesday night with fever, pain and bleeding. Found to have UTI and prescribed bactrim. Ultrasound not done at that time. Yesterday abran says she was contacted by Dr Alison Andrade as blood cultures and urine culture with gram negative rods. Added levaquin to antibiotics. Over the past 24 hours denies fever, chills. Continues to have bleeding and cramping. US today by Dr Hattie Way with retained products of conception. Informed consent obtained for D&C. OB History     Previous vaginal delivery        History reviewed. No pertinent past medical history. Past Surgical History:   Procedure Laterality Date    HX GYN       Social History     Occupational History    Not on file. Social History Main Topics    Smoking status: Never Smoker    Smokeless tobacco: Not on file    Alcohol use Yes      Comment: rarely    Drug use: No    Sexual activity: Not on file     History reviewed. No pertinent family history. Allergies   Allergen Reactions    Percocet [Oxycodone-Acetaminophen] Other (comments)     seizure     Prior to Admission medications    Medication Sig Start Date End Date Taking? Authorizing Provider   levoFLOXacin (LEVAQUIN) 250 mg tablet Take  by mouth daily. Yes Historical Provider   acetaminophen (TYLENOL) 325 mg tablet Take  by mouth every four (4) hours as needed for Pain. Yes Historical Provider   ondansetron hcl (ZOFRAN, AS HYDROCHLORIDE,) 4 mg tablet Take 4 mg by mouth every eight (8) hours as needed for Nausea.    Yes Historical Provider   trimethoprim-sulfamethoxazole (BACTRIM DS, SEPTRA DS) 160-800 mg per tablet Take 1 Tab by mouth two (2) times a day. 17  Yes Jaime Mejia NP   ibuprofen (MOTRIN) 600 mg tablet Take 1 Tab by mouth every six (6) hours as needed for Pain. 17  Yes Jaime Mejia NP        Review of Systems:  A comprehensive review of systems was negative except for that written in the History of Present Illness. Objective:     Vitals:    17 1231   Weight: 47.8 kg (105 lb 7 oz)   Height: 5' 8\" (1.727 m)       Physical Exam:  Patient without distress. Heart: Regular rate and rhythm  Lung: clear to auscultation throughout lung fields, no wheezes, no rales, no rhonchi and normal respiratory effort  Back: costovertebral angle tenderness absent  Abdomen: soft, nontender  Extr nontender    Assessment:     1. Incomplete  documented on US today  2. UTI and treatment with levaquin and bactrim    Plan:     Procedure(s) (LRB):  DILATATION AND CURETTAGE WITH SUCTION (N/A)  Discussed the risks of surgery including the risks of bleeding, infection, deep vein thrombosis, and surgical injuries to internal organs including but not limited to the bowels, bladder, rectum, and female reproductive organs. The patient understands the risks; any and all questions were answered to the patient's satisfaction.     Signed By:  Song Norman MD     2017

## 2017-06-02 NOTE — IP AVS SNAPSHOT
[address] [address] [address] 
[phone] Patient: Jessica Gordon MRN: [id number] You are allergic to the following Allergen Reactions Percocet (Oxycodone-Acetaminophen) Other (comments)  
 seizure Recent Documentation Height Weight BMI Smoking Status 1.727 m 47.8 kg 16.03 kg/m2 Never Smoker Emergency Contacts Name Discharge Info Relation Home Work Mobile Otis Jennings Kaley [address] CAREGIVER [3] Spouse [3]   [phone] About your hospitalization You were admitted on:  June 2, 2017 You last received care in the:  [facility] PHASE 2 RECOVERY You were discharged on:  June 2, 2017 Unit phone number:  [phone] Why you were hospitalized Your primary diagnosis was:  Not on File Providers Seen During Your Hospitalizations Provider Role Specialty Primary office phone Emanuel Winkler MD Attending Provider Obstetrics & Gynecology [phone] Your Primary Care Physician (PCP) Primary Care Physician Office Phone Office Fax UNKNOWN, PROVIDER ** None ** ** None ** Follow-up Information Follow up With Details Comments Contact Info Provider Unknown   Patient not available to ask Current Discharge Medication List  
  
CONTINUE these medications which have NOT CHANGED Dose & Instructions Dispensing Information Comments Morning Noon Evening Bedtime  
 ibuprofen 600 mg tablet Commonly known as:  MOTRIN Your last dose was: Your next dose is:    
   
   
 Dose:  600 mg Take 1 Tab by mouth every six (6) hours as needed for Pain. Quantity:  20 Tab Refills:  0 LEVAQUIN 250 mg tablet Generic drug:  levoFLOXacin Your last dose was: Your next dose is: Take  by mouth daily. Refills:  0 trimethoprim-sulfamethoxazole 160-800 mg per tablet Commonly known as:  BACTRIM DS, SEPTRA DS Your last dose was: Your next dose is:    
   
   
 Dose:  1 Tab Take 1 Tab by mouth two (2) times a day. Quantity:  20 Tab Refills:  0  
     
   
   
   
  
 TYLENOL 325 mg tablet Generic drug:  acetaminophen Your last dose was: Your next dose is: Take  by mouth every four (4) hours as needed for Pain. Refills:  0 ZOFRAN (AS HYDROCHLORIDE) 4 mg tablet Generic drug:  ondansetron hcl Your last dose was: Your next dose is:    
   
   
 Dose:  4 mg Take 4 mg by mouth every eight (8) hours as needed for Nausea. Refills:  0 Discharge Instructions Dilation and Curettage: What to Expect at [facility] Your Recovery Dilation and curettage (D&C) is a procedure to remove tissue from the inside of the uterus. The doctor used a curved tool, called a curette, to gently scrape tissue from your uterus. You are likely to have a backache, or cramps similar to menstrual cramps, and pass small clots of blood from your vagina for the first few days. You may continue to have light vaginal bleeding for several weeks after the procedure. You will probably be able to go back to most of your normal activities in 1 or 2 days. This care sheet gives you a general idea about how long it will take for you to recover. But each person recovers at a different pace. Follow the steps below to get better as quickly as possible. How can you care for yourself at home? Activity · Rest when you feel tired. Getting enough sleep will help you recover. · Avoid strenuous activities, such as bicycle riding, jogging, weight lifting, or aerobic exercise, until your doctor says it is okay. · Most women are able to return to work the day after the procedure. · You may have some light vaginal bleeding. Wear sanitary pads if needed. Do not douche or use tampons for 2 weeks or until your doctor says it is okay. · Ask your doctor when it is okay for you to have sex. · If you could become pregnant, talk about birth control with your doctor. Do not try to become pregnant until your doctor says it is okay. Diet · You can eat your normal diet. If your stomach is upset, try bland, low-fat foods like plain rice, broiled chicken, toast, and yogurt. · Drink plenty of fluids (unless your doctor tells you not to). Medicines · Your doctor will tell you if and when you can restart your medicines. He or she will also give you instructions about taking any new medicines. · If you take blood thinners, such as warfarin (Coumadin), clopidogrel (Plavix), or aspirin, be sure to talk to your doctor. He or she will tell you if and when to start taking those medicines again. Make sure that you understand exactly what your doctor wants you to do. · Be safe with medicines. Take pain medicines exactly as directed. ¨ If the doctor gave you a prescription medicine for pain, take it as prescribed. ¨ If you are not taking a prescription pain medicine, ask your doctor if you can take an over-the-counter medicine. · If you think your pain medicine is making you sick to your stomach: 
¨ Take your medicine after meals (unless your doctor has told you not to). ¨ Ask your doctor for a different pain medicine. · If your doctor prescribed antibiotics, take them as directed. Do not stop taking them just because you feel better. You need to take the full course of antibiotics. Follow-up care is a key part of your treatment and safety. Be sure to make and go to all appointments, and call your doctor if you are having problems. It's also a good idea to know your test results and keep a list of the medicines you take. When should you call for help? Call 911 anytime you think you may need emergency care. For example, call if: 
· You passed out (lost consciousness). · You have severe trouble breathing. · You have chest pain and shortness of breath, or you cough up blood. · You have severe pain in your belly. Call your doctor now or seek immediate medical care if: 
· You have bright red vaginal bleeding that soaks one or more pads each hour for 2 or more hours. · You pass blood clots that are larger than a golf ball. · You have vaginal discharge that smells bad. · You are sick to your stomach or cannot keep fluids down. · You have pain that does not get better after you take pain medicine. · You have pain that is getting worse 2 days after the procedure. · You have a fever over 100°F. 
· Your belly feels tender, or full and hard. Watch closely for changes in your health, and be sure to contact your doctor if: 
· You do not get better as expected. Where can you learn more? Go to http://georges-omero.info/. Enter [phone] in the search box to learn more about \"Dilation and Curettage: What to Expect at Home. \" Current as of: May 30, 2016 Content Version: 11.2 © 2642-6411 [employer]. Care instructions adapted under license by [employer] (which disclaims liability or warranty for this information). If you have questions about a medical condition or this instruction, always ask your healthcare professional. [place] [address] any warranty or liability for your use of this information. DISCHARGE SUMMARY from Nurse The following personal items are in your possession at time of discharge: 
 
Dental Appliances: None Visual Aid: None Home Medications: None Jewelry: Ring, Bracelet, Earrings Clothing: Footwear, Slippers, Pants, Undergarments Other Valuables: Blanca Mcfarlane, Cell Phone Personal Items Sent to Safe: in closet PATIENT INSTRUCTIONS: 
 
 After general anesthesia or intravenous sedation, for 24 hours or while taking prescription Narcotics: · Limit your activities · Do not drive and operate hazardous machinery · Do not make important personal or business decisions · Do  not drink alcoholic beverages · If you have not urinated within 8 hours after discharge, please contact your surgeon on call. Report the following to your surgeon: 
· Excessive pain, swelling, redness or odor of or around the surgical area · Temperature over 100.5 · Nausea and vomiting lasting longer than 4 hours or if unable to take medications · Any signs of decreased circulation or nerve impairment to extremity: change in color, persistent  numbness, tingling, coldness or increase pain · Any questions What to do at Home: 
Recommended activity: Activity as tolerated and no driving for today. These are general instructions for a healthy lifestyle: No smoking/ No tobacco products/ Avoid exposure to second hand smoke Surgeon General's Warning:  Quitting smoking now greatly reduces serious risk to your health. Obesity, smoking, and sedentary lifestyle greatly increases your risk for illness A healthy diet, regular physical exercise & weight monitoring are important for maintaining a healthy lifestyle You may be retaining fluid if you have a history of heart failure or if you experience any of the following symptoms:  Weight gain of 3 pounds or more overnight or 5 pounds in a week, increased swelling in our hands or feet or shortness of breath while lying flat in bed. Please call your doctor as soon as you notice any of these symptoms; do not wait until your next office visit. Recognize signs and symptoms of STROKE: 
 
F-face looks uneven A-arms unable to move or move unevenly S-speech slurred or non-existent T-time-call 911 as soon as signs and symptoms begin-DO NOT go Back to bed or wait to see if you get better-TIME IS BRAIN. Warning Signs of HEART ATTACK Call 911 if you have these symptoms: 
? Chest discomfort. Most heart attacks involve discomfort in the center of the chest that lasts more than a few minutes, or that goes away and comes back. It can feel like uncomfortable pressure, squeezing, fullness, or pain. ? Discomfort in other areas of the upper body. Symptoms can include pain or discomfort in one or both arms, the back, neck, jaw, or stomach. ? Shortness of breath with or without chest discomfort. ? Other signs may include breaking out in a cold sweat, nausea, or lightheadedness. Don't wait more than five minutes to call [address]! Fast action can save your life. Calling 911 is almost always the fastest way to get lifesaving treatment. Emergency Medical Services staff can begin treatment when they arrive  up to an hour sooner than if someone gets to the hospital by car. The discharge information has been reviewed with the patient. The patient verbalized understanding. Discharge medications reviewed with the patient and appropriate educational materials and side effects teaching were provided. Patient armband removed and given to patient to take home. Patient was informed of the privacy risks if armband lost or stolen Discharge Orders None Introducing [facility] SERVICES! [place] introduces [employer] patient portal. Now you can access parts of your medical record, email your doctor's office, and request medication refills online. 1. In your internet browser, go to [email or web address] 2. Click on the First Time User? Click Here link in the Sign In box. You will see the New Member Sign Up page. 3. Enter your [employer] Access Code exactly as it appears below. You will not need to use this code after youve completed the sign-up process. If you do not sign up before the expiration date, you must request a new code. · [employer] Access Code: [email or web address] Expires: 8/29/2017 10:15 PM 
 
4. Enter the last four digits of your Social Security Number (xxxx) and Date of Birth (mm/dd/yyyy) as indicated and click Submit. You will be taken to the next sign-up page. 5. Create a Lifetable ID. This will be your Lifetable login ID and cannot be changed, so think of one that is secure and easy to remember. 6. Create a Lifetable password. You can change your password at any time. 7. Enter your Password Reset Question and Answer. This can be used at a later time if you forget your password. 8. Enter your e-mail address. You will receive e-mail notification when new information is available in [address] 9. Click Sign Up. You can now view and download portions of your medical record. 10. Click the Download Summary menu link to download a portable copy of your medical information. If you have questions, please visit the Frequently Asked Questions section of the Lifetable website. Remember, Lifetable is NOT to be used for urgent needs. For medical emergencies, dial 911. Now available from your iPhone and Android! General Information Please provide this summary of care documentation to your next provider. Patient Signature:  ____________________________________________________________ Date:  ____________________________________________________________  
  
Annel Brought Provider Signature:  ____________________________________________________________ Date:  ____________________________________________________________

## 2017-06-02 NOTE — ANESTHESIA PREPROCEDURE EVALUATION
Anesthetic History               Review of Systems / Medical History  Patient summary reviewed, nursing notes reviewed and pertinent labs reviewed    Pulmonary                   Neuro/Psych              Cardiovascular                       GI/Hepatic/Renal                Endo/Other            Comments: Missed  Other Findings   Comments:   Risk Factors for Postoperative nausea/vomiting:       History of postoperative nausea/vomiting? NO       Female? YES       Motion sickness? NO       Intended opioid administration for postoperative analgesia? YES      Smoking Abstinence  Current Smoker? NO  Elective Surgery? YES  Seen preoperatively by anesthesiologist or proxy prior to day of surgery? YES  Pt abstained from smoking 24 hours prior to anesthesia?  N/A           Physical Exam    Airway  Mallampati: II  TM Distance: > 6 cm  Neck ROM: normal range of motion   Mouth opening: Normal     Cardiovascular    Rhythm: regular  Rate: normal         Dental  No notable dental hx       Pulmonary  Breath sounds clear to auscultation               Abdominal  GI exam deferred       Other Findings            Anesthetic Plan    ASA: 2  Anesthesia type: general          Induction: Intravenous  Anesthetic plan and risks discussed with: Patient

## 2017-06-02 NOTE — DISCHARGE INSTRUCTIONS
Dilation and Curettage: What to Expect at Home  Your Recovery  Dilation and curettage (D&C) is a procedure to remove tissue from the inside of the uterus. The doctor used a curved tool, called a curette, to gently scrape tissue from your uterus. You are likely to have a backache, or cramps similar to menstrual cramps, and pass small clots of blood from your vagina for the first few days. You may continue to have light vaginal bleeding for several weeks after the procedure. You will probably be able to go back to most of your normal activities in 1 or 2 days. This care sheet gives you a general idea about how long it will take for you to recover. But each person recovers at a different pace. Follow the steps below to get better as quickly as possible. How can you care for yourself at home? Activity  · Rest when you feel tired. Getting enough sleep will help you recover. · Avoid strenuous activities, such as bicycle riding, jogging, weight lifting, or aerobic exercise, until your doctor says it is okay. · Most women are able to return to work the day after the procedure. · You may have some light vaginal bleeding. Wear sanitary pads if needed. Do not douche or use tampons for 2 weeks or until your doctor says it is okay. · Ask your doctor when it is okay for you to have sex. · If you could become pregnant, talk about birth control with your doctor. Do not try to become pregnant until your doctor says it is okay. Diet  · You can eat your normal diet. If your stomach is upset, try bland, low-fat foods like plain rice, broiled chicken, toast, and yogurt. · Drink plenty of fluids (unless your doctor tells you not to). Medicines  · Your doctor will tell you if and when you can restart your medicines. He or she will also give you instructions about taking any new medicines. · If you take blood thinners, such as warfarin (Coumadin), clopidogrel (Plavix), or aspirin, be sure to talk to your doctor.  He or she will tell you if and when to start taking those medicines again. Make sure that you understand exactly what your doctor wants you to do. · Be safe with medicines. Take pain medicines exactly as directed. ¨ If the doctor gave you a prescription medicine for pain, take it as prescribed. ¨ If you are not taking a prescription pain medicine, ask your doctor if you can take an over-the-counter medicine. · If you think your pain medicine is making you sick to your stomach:  ¨ Take your medicine after meals (unless your doctor has told you not to). ¨ Ask your doctor for a different pain medicine. · If your doctor prescribed antibiotics, take them as directed. Do not stop taking them just because you feel better. You need to take the full course of antibiotics. Follow-up care is a key part of your treatment and safety. Be sure to make and go to all appointments, and call your doctor if you are having problems. It's also a good idea to know your test results and keep a list of the medicines you take. When should you call for help? Call 911 anytime you think you may need emergency care. For example, call if:  · You passed out (lost consciousness). · You have severe trouble breathing. · You have chest pain and shortness of breath, or you cough up blood. · You have severe pain in your belly. Call your doctor now or seek immediate medical care if:  · You have bright red vaginal bleeding that soaks one or more pads each hour for 2 or more hours. · You pass blood clots that are larger than a golf ball. · You have vaginal discharge that smells bad. · You are sick to your stomach or cannot keep fluids down. · You have pain that does not get better after you take pain medicine. · You have pain that is getting worse 2 days after the procedure. · You have a fever over 100°F.  · Your belly feels tender, or full and hard.   Watch closely for changes in your health, and be sure to contact your doctor if:  · You do not get better as expected. Where can you learn more? Go to http://georges-omero.info/. Enter [phone] in the search box to learn more about \"Dilation and Curettage: What to Expect at Home. \"  Current as of: May 30, 2016  Content Version: 11.2  © 2375-4973 [employer]. Care instructions adapted under license by [employer] (which disclaims liability or warranty for this information). If you have questions about a medical condition or this instruction, always ask your healthcare professional. [address] any warranty or liability for your use of this information. DISCHARGE SUMMARY from Nurse    The following personal items are in your possession at time of discharge:    Dental Appliances: None  Visual Aid: None     Home Medications: None  Jewelry: Ring, Bracelet, Earrings  Clothing: Footwear, Slippers, Pants, Undergarments  Other Valuables: Carlitos Trevizo, Cell Phone  Personal Items Sent to Safe: in closet          PATIENT INSTRUCTIONS:    After general anesthesia or intravenous sedation, for 24 hours or while taking prescription Narcotics:  · Limit your activities  · Do not drive and operate hazardous machinery  · Do not make important personal or business decisions  · Do  not drink alcoholic beverages  · If you have not urinated within 8 hours after discharge, please contact your surgeon on call. Report the following to your surgeon:  · Excessive pain, swelling, redness or odor of or around the surgical area  · Temperature over 100.5  · Nausea and vomiting lasting longer than 4 hours or if unable to take medications  · Any signs of decreased circulation or nerve impairment to extremity: change in color, persistent  numbness, tingling, coldness or increase pain  · Any questions        What to do at Home:  Recommended activity: Activity as tolerated and no driving for today.     These are general instructions for a healthy lifestyle:    No smoking/ No tobacco products/ Avoid exposure to second hand smoke    Surgeon General's Warning:  Quitting smoking now greatly reduces serious risk to your health. Obesity, smoking, and sedentary lifestyle greatly increases your risk for illness    A healthy diet, regular physical exercise & weight monitoring are important for maintaining a healthy lifestyle    You may be retaining fluid if you have a history of heart failure or if you experience any of the following symptoms:  Weight gain of 3 pounds or more overnight or 5 pounds in a week, increased swelling in our hands or feet or shortness of breath while lying flat in bed. Please call your doctor as soon as you notice any of these symptoms; do not wait until your next office visit. Recognize signs and symptoms of STROKE:    F-face looks uneven    A-arms unable to move or move unevenly    S-speech slurred or non-existent    T-time-call 911 as soon as signs and symptoms begin-DO NOT go       Back to bed or wait to see if you get better-TIME IS BRAIN. Warning Signs of HEART ATTACK     Call 911 if you have these symptoms:   Chest discomfort. Most heart attacks involve discomfort in the center of the chest that lasts more than a few minutes, or that goes away and comes back. It can feel like uncomfortable pressure, squeezing, fullness, or pain.  Discomfort in other areas of the upper body. Symptoms can include pain or discomfort in one or both arms, the back, neck, jaw, or stomach.  Shortness of breath with or without chest discomfort.  Other signs may include breaking out in a cold sweat, nausea, or lightheadedness. Don't wait more than five minutes to call 911 - MINUTES MATTER! Fast action can save your life. Calling 911 is almost always the fastest way to get lifesaving treatment. Emergency Medical Services staff can begin treatment when they arrive -- up to an hour sooner than if someone gets to the hospital by car.        The discharge information has been reviewed with the patient. The patient verbalized understanding. Discharge medications reviewed with the patient and appropriate educational materials and side effects teaching were provided. Patient armband removed and given to patient to take home.   Patient was informed of the privacy risks if armband lost or stolen

## 2017-06-02 NOTE — H&P
[address]    Name:  Davon De La Cruz  MR#:  [id number]  :  1985  Account #:  [de-identified]  Date of Adm:  2017      LOCATION:  The patient is in the preoperative area at the waiting for  surgery. ADMITTING DIAGNOSIS:  Retained products of conception, with fever  and UTI. BRIEF HISTORY OF PRESENT ILLNESS:  The patient is a 37-year-  old female,  5, para 2, and AB 3. She presented was initially  seen in office on , at which point in time she had what was  thought to be an early pregnancy. She returned to the office on  2017, at which point in time an ultrasound revealed that she had  a gestational sac in the lower uterine segment with debris, which  measured about 8 cm. She had a thick lining at the top of the fundus  which had a tremendous amount of blood flow. We gave her options  at that point in time, and she chose to try some Cytotec. She,  however, presented to the emergency room on the evening of the 31st  of May 2017 with high fevers, pain, and shaking chills. She was found  to have a bladder infection. The patient was evaluated with Dr. Davi Eller at that time, who did not feel that the infection was due to a  uterine infection. She was started on Levaquin and Bactrim, and was  sent home. The patient since then is still having chills. She has some  difficulty passing her urine. However, at this point in time, her nausea  and abdominal pain have subsided. An ultrasound was done again  today, which showed that she continues to have retained products of  pregnancy and has a gestational sac that remains in the lower uterine  segment. I discussed with the patient that given her recent infection  and that she is on two antibiotics and still not feeling up to her usual  self that she would best be served with an emergent dilatation and  curettage done today.   I discussed with her the risks and benefits of  the procedure, possible complications, and followup, and all of her  questions were answered. OBSTETRICAL HISTORY:  Her OB history is such that she has had  five pregnancies. She has 2 children delivered at term by vaginal  delivery and has had 3 miscarriages. GYNECOLOGICAL HISTORY:  Menarche at age 17. Usual regular  cycles that are not always monthly. She has a history of an abnormal  Pap in 2008. No history of any pelvic infections. REVIEW OF SYSTEMS:  She denies chest pain, shortness of breath,  abdominal pain, and change in bladder or bowel habits. MEDICATIONS  Her medications include:  1. Ibuprofen 600 mg every 6 hours p.r.n. pain. 2. Levofloxacin 500 mg every 24 hours for 7 days. 3. Bactrim twice a day for 7 days. ALLERGIES INCLUDE  1. CLINDAMYCIN. 2. PERCOCET, WHICH CAUSED HER A SEIZURE. SOCIAL HISTORY:  She is a former smoker. She is . She  denies other alcohol or drug use. PAST SURGICAL HISTORY:  Her past surgical history includes  wisdom teeth removal and a dilatation and curettage. She had a  labioplasty in 2016 and bilateral eye surgery in the past at age 5. PAST MEDICAL HISTORY:  Her past medical history includes anxiety,  depression, history of C. difficile infection, and thyroid dysfunction. PHYSICAL EXAMINATION  VITAL SIGNS:  On physical exam, her weight is 108 and height is 5  foot 8. BMI is 16. Blood pressure is 110/67. LUNGS:  Clear. CARDIOVASCULAR:  Exam reveals a regular rate and rhythm. ABDOMEN:  Her abdomen is soft, flat, and nontender. PELVIC:  Deferred today. EXTREMITIES:  Without edema. IMPRESSION:  Retained products of conception. In light of recent fever to 102, with shaking chills and a urinary tract  infection, on 2 antibiotics, I feel that the uterus needs to be evacuated  in order to help her complete her infection. PLAN:  The patient is scheduled for an emergent outpatient dilatation  and curettage by Keesha Washburn M.D.   The risks and benefits of  surgery have been discussed with the patient. All of her questions are  answered.         Juan Carlos Goff MD MD / [address]  D:  06/02/2017   12:40  T:  06/02/2017   13:18  Job #:  [id number]

## 2017-06-02 NOTE — PERIOP NOTES
1411  Received pt. Connected pt to monitor. VSS. Assessment preformed. RN at bedside. Will continue to monitor. [address] called for update on pt for pt's friend [facility]. Update given on pt status.

## 2017-06-03 LAB
BACTERIA SPEC CULT: ABNORMAL
GRAM STN SPEC: ABNORMAL
SERVICE CMNT-IMP: ABNORMAL

## 2017-12-26 ENCOUNTER — HOSPITAL ENCOUNTER (EMERGENCY)
Age: 32
Discharge: HOME | End: 2017-12-26
Attending: EMERGENCY MEDICINE
Payer: OTHER GOVERNMENT

## 2017-12-26 VITALS
SYSTOLIC BLOOD PRESSURE: 140 MMHG | DIASTOLIC BLOOD PRESSURE: 92 MMHG | HEIGHT: 68 IN | HEART RATE: 88 BPM | TEMPERATURE: 98.5 F | RESPIRATION RATE: 16 BRPM | BODY MASS INDEX: 15.91 KG/M2 | OXYGEN SATURATION: 98 % | WEIGHT: 105 LBS

## 2017-12-26 DIAGNOSIS — Z71.1 CONCERN ABOUT STD IN FEMALE WITHOUT DIAGNOSIS: Primary | ICD-10-CM

## 2017-12-26 DIAGNOSIS — R10.84 ABDOMINAL PAIN, GENERALIZED: ICD-10-CM

## 2017-12-26 DIAGNOSIS — R11.0 NAUSEA WITHOUT VOMITING: ICD-10-CM

## 2017-12-26 LAB
APPEARANCE UR: CLEAR
BILIRUB UR QL: NEGATIVE
COLOR UR: YELLOW
GLUCOSE UR STRIP.AUTO-MCNC: NEGATIVE MG/DL
HCG UR QL: NEGATIVE
HGB UR QL STRIP: NEGATIVE
KETONES UR QL STRIP.AUTO: ABNORMAL MG/DL
LEUKOCYTE ESTERASE UR QL STRIP.AUTO: NEGATIVE
NITRITE UR QL STRIP.AUTO: NEGATIVE
PH UR STRIP: 5 [PH] (ref 5–8)
PROT UR STRIP-MCNC: NEGATIVE MG/DL
SERVICE CMNT-IMP: NORMAL
SP GR UR REFRACTOMETRY: 1.02 (ref 1–1.03)
UROBILINOGEN UR QL STRIP.AUTO: 0.2 EU/DL (ref 0.2–1)
WET PREP GENITAL: NORMAL

## 2017-12-26 PROCEDURE — 96372 THER/PROPH/DIAG INJ SC/IM: CPT

## 2017-12-26 PROCEDURE — 99283 EMERGENCY DEPT VISIT LOW MDM: CPT

## 2017-12-26 PROCEDURE — 87491 CHLMYD TRACH DNA AMP PROBE: CPT

## 2017-12-26 PROCEDURE — 74011250637 HC RX REV CODE- 250/637: Performed by: PHYSICIAN ASSISTANT

## 2017-12-26 PROCEDURE — 81003 URINALYSIS AUTO W/O SCOPE: CPT | Performed by: EMERGENCY MEDICINE

## 2017-12-26 PROCEDURE — 81025 URINE PREGNANCY TEST: CPT | Performed by: EMERGENCY MEDICINE

## 2017-12-26 PROCEDURE — 87210 SMEAR WET MOUNT SALINE/INK: CPT

## 2017-12-26 PROCEDURE — 74011250636 HC RX REV CODE- 250/636: Performed by: PHYSICIAN ASSISTANT

## 2017-12-26 PROCEDURE — 74011000250 HC RX REV CODE- 250: Performed by: PHYSICIAN ASSISTANT

## 2017-12-26 RX ORDER — NAPROXEN 500 MG/1
500 TABLET ORAL 2 TIMES DAILY WITH MEALS
Qty: 20 TAB | Refills: 0 | Status: SHIPPED | OUTPATIENT
Start: 2017-12-26 | End: 2018-01-05

## 2017-12-26 RX ORDER — FLUCONAZOLE 150 MG/1
150 TABLET ORAL
Qty: 1 TAB | Refills: 1 | Status: SHIPPED | OUTPATIENT
Start: 2017-12-26 | End: 2017-12-26

## 2017-12-26 RX ORDER — AZITHROMYCIN 250 MG/1
1000 TABLET, FILM COATED ORAL
Status: COMPLETED | OUTPATIENT
Start: 2017-12-26 | End: 2017-12-26

## 2017-12-26 RX ORDER — ONDANSETRON 8 MG/1
8 TABLET, ORALLY DISINTEGRATING ORAL
Qty: 12 TAB | Refills: 0 | Status: SHIPPED | OUTPATIENT
Start: 2017-12-26 | End: 2019-04-03

## 2017-12-26 RX ADMIN — LIDOCAINE HYDROCHLORIDE 250 MG: 10 INJECTION, SOLUTION EPIDURAL; INFILTRATION; INTRACAUDAL; PERINEURAL at 16:02

## 2017-12-26 RX ADMIN — AZITHROMYCIN 1000 MG: 250 TABLET, FILM COATED ORAL at 16:01

## 2017-12-26 NOTE — ED PROVIDER NOTES
HPI Comments: Tushar Rock is a 29 y.o. Female L5Q5 with no medical history who presents today for a 4 day history of vaginal pain and vaginal discharge. Patient states she took a diflucan at home and tried OTC vaginal cream because she thought she had a yeast infection but the discharge persists with odor. She is concerned it may bacterial instead of yeast. Denies any urinary symptoms but reports multiple UTIs in the past but states she never has any symptoms. Denies chance of preg. Pt states she may have hyperthyroism but is afraid to be seen for it; positive family history. Pt denies any fevers or chills, headache, dizziness or light headedness, ENT issues, CP or discomfort, SOB, cough, n/v/d/c, back pain, diaphoresis, melena/hematochezia, dysuria, hematuria, frequency, focal weakness/numbness/tingling, or rash. Patient has no other complaints at this time. Patient is a 29 y.o. female presenting with female genitourinary complaint and urinary pain. The history is provided by the patient. Vaginal Pain    Associated symptoms include nausea. Pertinent negatives include no fever, no abdominal pain, no constipation, no diarrhea, no vomiting, no dysuria and no frequency. Urinary Pain    Associated symptoms include nausea and vaginal discharge. Pertinent negatives include no chills, no vomiting, no frequency, no hematuria, no flank pain, no abdominal pain and no back pain. History reviewed. No pertinent past medical history. Past Surgical History:   Procedure Laterality Date    HX GYN           History reviewed. No pertinent family history. Social History     Social History    Marital status:      Spouse name: N/A    Number of children: N/A    Years of education: N/A     Occupational History    Not on file.      Social History Main Topics    Smoking status: Never Smoker    Smokeless tobacco: Never Used    Alcohol use Yes      Comment: rarely    Drug use: No    Sexual activity: Not on file     Other Topics Concern    Not on file     Social History Narrative         ALLERGIES: Percocet [oxycodone-acetaminophen]    Review of Systems   Constitutional: Negative for chills and fever. HENT: Negative for congestion, rhinorrhea and sore throat. Respiratory: Negative for cough and shortness of breath. Cardiovascular: Negative for chest pain. Gastrointestinal: Positive for nausea. Negative for abdominal pain, blood in stool, constipation, diarrhea and vomiting. Genitourinary: Positive for pelvic pain, vaginal discharge and vaginal pain. Negative for difficulty urinating, dysuria, flank pain, frequency, hematuria, menstrual problem and vaginal bleeding. Musculoskeletal: Negative for back pain and myalgias. Skin: Negative for rash and wound. Neurological: Negative for dizziness and headaches. Vitals:    12/26/17 1312   BP: (!) 140/92   Pulse: 88   Resp: 16   Temp: 98.5 °F (36.9 °C)   SpO2: 98%   Weight: 47.6 kg (105 lb)   Height: 5' 8\" (1.727 m)            Physical Exam   Constitutional: She is oriented to person, place, and time. She appears well-developed and well-nourished. No distress. HENT:   Head: Normocephalic and atraumatic. Eyes: Conjunctivae are normal.   Neck: Normal range of motion. Neck supple. Cardiovascular: Normal rate, regular rhythm and normal heart sounds. Pulmonary/Chest: Effort normal and breath sounds normal. No respiratory distress. She exhibits no tenderness. Abdominal: Soft. Bowel sounds are normal. She exhibits no distension. There is tenderness in the suprapubic area. There is no rigidity, no rebound, no guarding, no CVA tenderness, no tenderness at McBurney's point and negative Sharp's sign. Genitourinary: There is erythema and tenderness in the vagina. No bleeding in the vagina. No foreign body in the vagina. No signs of injury around the vagina. Vaginal discharge found. Musculoskeletal: She exhibits no edema or deformity. Neurological: She is alert and oriented to person, place, and time. She has normal reflexes. Skin: Skin is warm and dry. She is not diaphoretic. Psychiatric: She has a normal mood and affect. Nursing note and vitals reviewed. MDM  Number of Diagnoses or Management Options  Diagnosis management comments: Differential: pregnancy, yeast infection, GC, trich, BV, UTI, pyelonephritis     Plan: Will order ua, urine preg, wet prep, GC and pelvic exam    3:46 PM  I have shared the wet prep and ua results with the patient. Given her concerns for STD she wishes to be treated today. I have advised her not to have sex for one week and to have all partners she treated. I will send home with pain medication and diflucan if symptoms worsen in the next couple days I have advised her to take it. I will also discharge home with zofran. I have advised patient to make an appointment with endocrinology for thyroid evaluation. I have also advised patient to follow up with OBGYN in the next week. Patient agrees with the plan and management and states all questions have been thoroughly answered and there are no more remaining questions. Amount and/or Complexity of Data Reviewed  Clinical lab tests: ordered and reviewed    Risk of Complications, Morbidity, and/or Mortality  Presenting problems: low  Diagnostic procedures: low  Management options: low      ED Course       Pelvic Exam  Date/Time: 12/26/2017 2:37 PM  Performed by: PA  Procedure duration:  10 minutes. Documented by:  myself . Exam assisted by:  Geovanni Booth RN. Type of exam performed: bimanual and speculum. External genitalia appearance: normal and swelling (Mild swelling/irritation/erythema ). Vaginal exam:  odor and discharge. The amount of discharge was:  moderate. Cervical exam:  normal, os closed and no cervical motion tenderness. Specimen(s) collected:  chlamydia, GC and vaginal culture.   Bimanual exam:  normal.    Patient tolerance: Patient tolerated the procedure well with no immediate complications

## 2017-12-26 NOTE — ED NOTES
I performed a brief evaluation, including history and physical, of the patient here in triage and I have determined that pt will need further treatment and evaluation from the fast track provider. I have placed initial orders to help in expediting patients care.      December 26, 2017 at 1:08 PM - Monica Smith DO

## 2017-12-26 NOTE — DISCHARGE INSTRUCTIONS
Abdominal Pain: Care Instructions  Your Care Instructions    Abdominal pain has many possible causes. Some aren't serious and get better on their own in a few days. Others need more testing and treatment. If your pain continues or gets worse, you need to be rechecked and may need more tests to find out what is wrong. You may need surgery to correct the problem. Don't ignore new symptoms, such as fever, nausea and vomiting, urination problems, pain that gets worse, and dizziness. These may be signs of a more serious problem. Your doctor may have recommended a follow-up visit in the next 8 to 12 hours. If you are not getting better, you may need more tests or treatment. The doctor has checked you carefully, but problems can develop later. If you notice any problems or new symptoms, get medical treatment right away. Follow-up care is a key part of your treatment and safety. Be sure to make and go to all appointments, and call your doctor if you are having problems. It's also a good idea to know your test results and keep a list of the medicines you take. How can you care for yourself at home? · Rest until you feel better. · To prevent dehydration, drink plenty of fluids, enough so that your urine is light yellow or clear like water. Choose water and other caffeine-free clear liquids until you feel better. If you have kidney, heart, or liver disease and have to limit fluids, talk with your doctor before you increase the amount of fluids you drink. · If your stomach is upset, eat mild foods, such as rice, dry toast or crackers, bananas, and applesauce. Try eating several small meals instead of two or three large ones. · Wait until 48 hours after all symptoms have gone away before you have spicy foods, alcohol, and drinks that contain caffeine. · Do not eat foods that are high in fat. · Avoid anti-inflammatory medicines such as aspirin, ibuprofen (Advil, Motrin), and naproxen (Aleve).  These can cause stomach upset. Talk to your doctor if you take daily aspirin for another health problem. When should you call for help? Call 911 anytime you think you may need emergency care. For example, call if:  ? · You passed out (lost consciousness). ? · You pass maroon or very bloody stools. ? · You vomit blood or what looks like coffee grounds. ? · You have new, severe belly pain. ?Call your doctor now or seek immediate medical care if:  ? · Your pain gets worse, especially if it becomes focused in one area of your belly. ? · You have a new or higher fever. ? · Your stools are black and look like tar, or they have streaks of blood. ? · You have unexpected vaginal bleeding. ? · You have symptoms of a urinary tract infection. These may include:  ¨ Pain when you urinate. ¨ Urinating more often than usual.  ¨ Blood in your urine. ? · You are dizzy or lightheaded, or you feel like you may faint. ? Watch closely for changes in your health, and be sure to contact your doctor if:  ? · You are not getting better after 1 day (24 hours). Where can you learn more? Go to http://georges-omero.info/. Enter Y651 in the search box to learn more about \"Abdominal Pain: Care Instructions. \"  Current as of: March 20, 2017  Content Version: 11.4  © 8890-0314 [employer]. Care instructions adapted under license by [employer] (which disclaims liability or warranty for this information). If you have questions about a medical condition or this instruction, always ask your healthcare professional. [place] [address] any warranty or liability for your use of this information.

## 2017-12-27 LAB
C TRACH RRNA SPEC QL NAA+PROBE: NEGATIVE
N GONORRHOEA RRNA SPEC QL NAA+PROBE: NEGATIVE
SPECIMEN SOURCE: NORMAL

## 2019-04-03 ENCOUNTER — HOSPITAL ENCOUNTER (EMERGENCY)
Age: 34
Discharge: HOME | End: 2019-04-03
Attending: EMERGENCY MEDICINE
Payer: OTHER GOVERNMENT

## 2019-04-03 VITALS
HEART RATE: 98 BPM | TEMPERATURE: 97.8 F | HEIGHT: 68 IN | RESPIRATION RATE: 14 BRPM | DIASTOLIC BLOOD PRESSURE: 66 MMHG | BODY MASS INDEX: 14.25 KG/M2 | OXYGEN SATURATION: 100 % | SYSTOLIC BLOOD PRESSURE: 102 MMHG | WEIGHT: 94 LBS

## 2019-04-03 DIAGNOSIS — R63.4 WEIGHT LOSS, UNINTENTIONAL: ICD-10-CM

## 2019-04-03 DIAGNOSIS — R03.0 ELEVATED BLOOD PRESSURE READING: ICD-10-CM

## 2019-04-03 DIAGNOSIS — R10.84 ABDOMINAL PAIN, GENERALIZED: Primary | ICD-10-CM

## 2019-04-03 LAB
ALBUMIN SERPL-MCNC: 4 G/DL (ref 3.4–5)
ALBUMIN/GLOB SERPL: 1.3 {RATIO} (ref 0.8–1.7)
ALP SERPL-CCNC: 53 U/L (ref 45–117)
ALT SERPL-CCNC: 20 U/L (ref 13–56)
AMORPH CRY URNS QL MICRO: ABNORMAL
ANION GAP SERPL CALC-SCNC: 3 MMOL/L (ref 3–18)
APPEARANCE UR: ABNORMAL
AST SERPL-CCNC: 12 U/L (ref 15–37)
BACTERIA URNS QL MICRO: NEGATIVE /HPF
BASOPHILS # BLD: 0 K/UL (ref 0–0.1)
BASOPHILS NFR BLD: 1 % (ref 0–2)
BILIRUB SERPL-MCNC: 0.2 MG/DL (ref 0.2–1)
BILIRUB UR QL: NEGATIVE
BUN SERPL-MCNC: 10 MG/DL (ref 7–18)
BUN/CREAT SERPL: 13 (ref 12–20)
CALCIUM SERPL-MCNC: 8.2 MG/DL (ref 8.5–10.1)
CHLORIDE SERPL-SCNC: 107 MMOL/L (ref 100–108)
CO2 SERPL-SCNC: 27 MMOL/L (ref 21–32)
COLOR UR: YELLOW
CREAT SERPL-MCNC: 0.8 MG/DL (ref 0.6–1.3)
DIFFERENTIAL METHOD BLD: ABNORMAL
EOSINOPHIL # BLD: 0.3 K/UL (ref 0–0.4)
EOSINOPHIL NFR BLD: 6 % (ref 0–5)
EPITH CASTS URNS QL MICRO: ABNORMAL /LPF (ref 0–5)
ERYTHROCYTE [DISTWIDTH] IN BLOOD BY AUTOMATED COUNT: 12.9 % (ref 11.6–14.5)
GLOBULIN SER CALC-MCNC: 3.1 G/DL (ref 2–4)
GLUCOSE SERPL-MCNC: 76 MG/DL (ref 74–99)
GLUCOSE UR STRIP.AUTO-MCNC: NEGATIVE MG/DL
HCG SERPL QL: NEGATIVE
HCT VFR BLD AUTO: 41.3 % (ref 35–45)
HGB BLD-MCNC: 13.6 G/DL (ref 12–16)
HGB UR QL STRIP: ABNORMAL
INR PPP: 1.1 (ref 0.8–1.2)
KETONES UR QL STRIP.AUTO: NEGATIVE MG/DL
LEUKOCYTE ESTERASE UR QL STRIP.AUTO: NEGATIVE
LYMPHOCYTES # BLD: 1 K/UL (ref 0.9–3.6)
LYMPHOCYTES NFR BLD: 23 % (ref 21–52)
MCH RBC QN AUTO: 30.2 PG (ref 24–34)
MCHC RBC AUTO-ENTMCNC: 32.9 G/DL (ref 31–37)
MCV RBC AUTO: 91.6 FL (ref 74–97)
MONOCYTES # BLD: 0.7 K/UL (ref 0.05–1.2)
MONOCYTES NFR BLD: 16 % (ref 3–10)
NEUTS SEG # BLD: 2.3 K/UL (ref 1.8–8)
NEUTS SEG NFR BLD: 54 % (ref 40–73)
NITRITE UR QL STRIP.AUTO: NEGATIVE
PH UR STRIP: 7.5 [PH] (ref 5–8)
PLATELET # BLD AUTO: 147 K/UL (ref 135–420)
PMV BLD AUTO: 13.6 FL (ref 9.2–11.8)
POTASSIUM SERPL-SCNC: 4.1 MMOL/L (ref 3.5–5.5)
PROT SERPL-MCNC: 7.1 G/DL (ref 6.4–8.2)
PROT UR STRIP-MCNC: NEGATIVE MG/DL
PROTHROMBIN TIME: 14.1 SEC (ref 11.5–15.2)
RBC # BLD AUTO: 4.51 M/UL (ref 4.2–5.3)
RBC #/AREA URNS HPF: ABNORMAL /HPF (ref 0–5)
SODIUM SERPL-SCNC: 137 MMOL/L (ref 136–145)
SP GR UR REFRACTOMETRY: 1.01 (ref 1–1.03)
TSH SERPL DL<=0.05 MIU/L-ACNC: 0.69 UIU/ML (ref 0.36–3.74)
UROBILINOGEN UR QL STRIP.AUTO: 0.2 EU/DL (ref 0.2–1)
WBC # BLD AUTO: 4.3 K/UL (ref 4.6–13.2)
WBC URNS QL MICRO: ABNORMAL /HPF (ref 0–4)

## 2019-04-03 PROCEDURE — 74011250636 HC RX REV CODE- 250/636: Performed by: NURSE PRACTITIONER

## 2019-04-03 PROCEDURE — 84443 ASSAY THYROID STIM HORMONE: CPT

## 2019-04-03 PROCEDURE — 85025 COMPLETE CBC W/AUTO DIFF WBC: CPT

## 2019-04-03 PROCEDURE — 84703 CHORIONIC GONADOTROPIN ASSAY: CPT

## 2019-04-03 PROCEDURE — 81001 URINALYSIS AUTO W/SCOPE: CPT

## 2019-04-03 PROCEDURE — 85610 PROTHROMBIN TIME: CPT

## 2019-04-03 PROCEDURE — 96375 TX/PRO/DX INJ NEW DRUG ADDON: CPT

## 2019-04-03 PROCEDURE — 80053 COMPREHEN METABOLIC PANEL: CPT

## 2019-04-03 PROCEDURE — 96372 THER/PROPH/DIAG INJ SC/IM: CPT

## 2019-04-03 PROCEDURE — 99284 EMERGENCY DEPT VISIT MOD MDM: CPT

## 2019-04-03 PROCEDURE — 96361 HYDRATE IV INFUSION ADD-ON: CPT

## 2019-04-03 PROCEDURE — 96374 THER/PROPH/DIAG INJ IV PUSH: CPT

## 2019-04-03 RX ORDER — ONDANSETRON 4 MG/1
4 TABLET, FILM COATED ORAL
Qty: 12 TAB | Refills: 0 | Status: SHIPPED | OUTPATIENT
Start: 2019-04-03 | End: 2019-06-23

## 2019-04-03 RX ORDER — ONDANSETRON 2 MG/ML
4 INJECTION INTRAMUSCULAR; INTRAVENOUS
Status: COMPLETED | OUTPATIENT
Start: 2019-04-03 | End: 2019-04-03

## 2019-04-03 RX ORDER — FAMOTIDINE 10 MG/ML
20 INJECTION INTRAVENOUS
Status: COMPLETED | OUTPATIENT
Start: 2019-04-03 | End: 2019-04-03

## 2019-04-03 RX ORDER — LIOTHYRONINE SODIUM 5 UG/1
5 TABLET ORAL DAILY
COMMUNITY

## 2019-04-03 RX ORDER — LEVOTHYROXINE SODIUM 25 UG/1
TABLET ORAL
COMMUNITY

## 2019-04-03 RX ORDER — DICYCLOMINE HYDROCHLORIDE 10 MG/ML
20 INJECTION INTRAMUSCULAR
Status: COMPLETED | OUTPATIENT
Start: 2019-04-03 | End: 2019-04-03

## 2019-04-03 RX ORDER — DICYCLOMINE HYDROCHLORIDE 20 MG/1
20 TABLET ORAL EVERY 6 HOURS
Qty: 20 TAB | Refills: 0 | Status: SHIPPED | OUTPATIENT
Start: 2019-04-03 | End: 2019-04-08

## 2019-04-03 RX ORDER — DICYCLOMINE HYDROCHLORIDE 10 MG/ML
20 INJECTION INTRAMUSCULAR
Status: DISCONTINUED | OUTPATIENT
Start: 2019-04-04 | End: 2019-04-03

## 2019-04-03 RX ADMIN — DICYCLOMINE HYDROCHLORIDE 20 MG: 20 INJECTION, SOLUTION INTRAMUSCULAR at 22:20

## 2019-04-03 RX ADMIN — SODIUM CHLORIDE 1000 ML: 900 INJECTION, SOLUTION INTRAVENOUS at 22:20

## 2019-04-03 RX ADMIN — ONDANSETRON 4 MG: 2 INJECTION INTRAMUSCULAR; INTRAVENOUS at 22:21

## 2019-04-03 RX ADMIN — FAMOTIDINE 20 MG: 10 INJECTION, SOLUTION INTRAVENOUS at 22:21

## 2019-04-04 NOTE — DISCHARGE INSTRUCTIONS

## 2019-04-04 NOTE — ED TRIAGE NOTES
C/o generalized abdominal pain, loss of appetite, and fatigue. Also has had bright red blood in stool x1.5 months.

## 2019-04-04 NOTE — ED PROVIDER NOTES
9:27 PM  
30 y.o. female presents to ED C/O abdominal pain, weight loss, fatigue. Review of gluten intolerance, Hashimoto's. Patient reports 6 weeks of worsening abdominal pain, described as cramping and burning, worse after eating. Patient reports concerned because she is feeling weaker and more fatigued over the last 2 weeks, voices concern for a weight loss of 10 pounds in 2 weeks, questions if she is getting nutrients from the food she is eating. Patient also reports intermittent bright red blood per rectum for the last 6 weeks. Patient has an appointment next week with GI. Patient denies fever, dysuria, vaginal discharge. Patient reports abnormally irregular vaginal bleeding for the last 6 months. Patient is a non-smoker. Patient's primary care doctor put her on Paxil 1 month ago with concerns of abdominal pain and weight loss were associated with stress. Patient denies any other symptoms or complaints. Past Medical History:  
Diagnosis Date  Gluten intolerance  Hyperthyroidism Past Surgical History:  
Procedure Laterality Date  HX GYN History reviewed. No pertinent family history. Social History Socioeconomic History  Marital status:  Spouse name: Not on file  Number of children: Not on file  Years of education: Not on file  Highest education level: Not on file Occupational History  Not on file Social Needs  Financial resource strain: Not on file  Food insecurity:  
  Worry: Not on file Inability: Not on file  Transportation needs:  
  Medical: Not on file Non-medical: Not on file Tobacco Use  Smoking status: Never Smoker  Smokeless tobacco: Never Used Substance and Sexual Activity  Alcohol use: Yes Comment: rarely  Drug use: No  
 Sexual activity: Not on file Lifestyle  Physical activity:  
  Days per week: Not on file Minutes per session: Not on file  Stress: Not on file Relationships  Social connections:  
  Talks on phone: Not on file Gets together: Not on file Attends Druze service: Not on file Active member of club or organization: Not on file Attends meetings of clubs or organizations: Not on file Relationship status: Not on file  Intimate partner violence:  
  Fear of current or ex partner: Not on file Emotionally abused: Not on file Physically abused: Not on file Forced sexual activity: Not on file Other Topics Concern  Not on file Social History Narrative  Not on file ALLERGIES: Percocet [oxycodone-acetaminophen] Review of Systems Constitutional: Positive for appetite change, fatigue and unexpected weight change. Negative for fever. HENT: Negative for congestion, rhinorrhea and sore throat. Respiratory: Negative for cough, shortness of breath and wheezing. Cardiovascular: Negative for chest pain and leg swelling. Gastrointestinal: Positive for abdominal pain, blood in stool, nausea and vomiting. Negative for constipation and diarrhea. Genitourinary: Negative for dysuria. Musculoskeletal: Negative for arthralgias and back pain. Neurological: Negative for dizziness, syncope and headaches. All other systems reviewed and are negative. Vitals:  
 04/03/19 2230 04/03/19 2245 04/03/19 2300 04/03/19 2315 BP: 124/90  102/66 Pulse:      
Resp:      
Temp:      
SpO2: 99% 100% 100% 100% Weight:      
Height:      
      
 
Physical Exam  
Constitutional: She is oriented to person, place, and time. Thin female, mildly anxious and tearful HENT:  
Head: Atraumatic. Right Ear: External ear normal.  
Left Ear: External ear normal.  
Mouth/Throat: Oropharynx is clear and moist.  
Eyes: Conjunctivae and EOM are normal.  
Neck: Normal range of motion. Cardiovascular: Normal rate, regular rhythm and intact distal pulses. Pulmonary/Chest: Effort normal and breath sounds normal. No stridor.  No respiratory distress. She has no wheezes. She has no rales. Abdominal:  
Diffuse abdominal pain with palpation, no masses or guarding noted Genitourinary:  
Genitourinary Comments: Negative Hemoccult. Musculoskeletal: Normal range of motion. Neurological: She is alert and oriented to person, place, and time. She exhibits normal muscle tone. Coordination normal.  
Skin: Skin is warm and dry. No erythema. Nursing note and vitals reviewed. MDM Number of Diagnoses or Management Options Abdominal pain, generalized:  
Elevated blood pressure reading:  
Weight loss, unintentional:  
Diagnosis management comments: Differential Diagnosis: biliary disease, hepatitis, pancreatitis, PUD, gastritis, gastroenteritis,SBO, LBO, mesenteric ischemia/infarction, IBS, IBD, Celiac disease, thyroid disorder, anemia, dehydration Plan: CBC, CMP, UA, hCG, TSH. Patient's abdominal pain is diffuse, nonfocal, has been worsening for 6 weeks, doubt surgical etiology, no indication for emergent imaging at this time. Progress: No evidence of cystitis, dehydration, renal abnormality, electrolyte abnormality, anemia, leukocytosis, TSH is within normal limits. 11:29 PM patient reports her abdominal pain is much improved at this time, no longer burning and aching like it was. Patient has had no vomiting while in the department. Patient educated she must follow-up with GI for further evaluation of her chronic abdominal pain, will discharge with Bentyl and Zofran for symptomatic treatment. Patient referred to primary care doctor for further evaluation. Patient educated to return to the ED for any new or worsening symptoms. Patient denies further questions. Amount and/or Complexity of Data Reviewed Clinical lab tests: ordered and reviewed ED Course as of Apr 04 0026 Wed Apr 03, 2019  
9462 HCG, Ql.: NEGATIVE  [] 9512 Patient reports she is feeling much better, cramping abdominal pain has improved after Bentyl Zofran and Pepcid [] ED Course User Index [] Candida Bacon NP Procedures RESULTS: 
 
No orders to display Labs Reviewed CBC WITH AUTOMATED DIFF - Abnormal; Notable for the following components:  
    Result Value WBC 4.3 (*) MPV 13.6 (*) MONOCYTES 16 (*) EOSINOPHILS 6 (*) All other components within normal limits METABOLIC PANEL, COMPREHENSIVE - Abnormal; Notable for the following components:  
 Calcium 8.2 (*) AST (SGOT) 12 (*) All other components within normal limits URINALYSIS W/ RFLX MICROSCOPIC - Abnormal; Notable for the following components:  
 Blood SMALL (*) All other components within normal limits URINE MICROSCOPIC ONLY - Abnormal; Notable for the following components:  
 Amorphous Crystals 3+ (*) All other components within normal limits PROTHROMBIN TIME + INR  
HCG QL SERUM  
TSH 3RD GENERATION Recent Results (from the past 12 hour(s)) CBC WITH AUTOMATED DIFF Collection Time: 04/03/19  9:30 PM  
Result Value Ref Range WBC 4.3 (L) 4.6 - 13.2 K/uL  
 RBC 4.51 4.20 - 5.30 M/uL  
 HGB 13.6 12.0 - 16.0 g/dL HCT 41.3 35.0 - 45.0 % MCV 91.6 74.0 - 97.0 FL  
 MCH 30.2 24.0 - 34.0 PG  
 MCHC 32.9 31.0 - 37.0 g/dL  
 RDW 12.9 11.6 - 14.5 % PLATELET 406 303 - 549 K/uL MPV 13.6 (H) 9.2 - 11.8 FL  
 NEUTROPHILS 54 40 - 73 % LYMPHOCYTES 23 21 - 52 % MONOCYTES 16 (H) 3 - 10 % EOSINOPHILS 6 (H) 0 - 5 % BASOPHILS 1 0 - 2 %  
 ABS. NEUTROPHILS 2.3 1.8 - 8.0 K/UL  
 ABS. LYMPHOCYTES 1.0 0.9 - 3.6 K/UL  
 ABS. MONOCYTES 0.7 0.05 - 1.2 K/UL  
 ABS. EOSINOPHILS 0.3 0.0 - 0.4 K/UL  
 ABS. BASOPHILS 0.0 0.0 - 0.1 K/UL  
 DF AUTOMATED METABOLIC PANEL, COMPREHENSIVE Collection Time: 04/03/19  9:30 PM  
Result Value Ref Range Sodium 137 136 - 145 mmol/L Potassium 4.1 3.5 - 5.5 mmol/L  Chloride 107 100 - 108 mmol/L  
 CO2 27 21 - 32 mmol/L  
 Anion gap 3 3.0 - 18 mmol/L Glucose 76 74 - 99 mg/dL BUN 10 7.0 - 18 MG/DL Creatinine 0.80 0.6 - 1.3 MG/DL  
 BUN/Creatinine ratio 13 12 - 20 GFR est AA >60 >60 ml/min/1.73m2 GFR est non-AA >60 >60 ml/min/1.73m2 Calcium 8.2 (L) 8.5 - 10.1 MG/DL Bilirubin, total 0.2 0.2 - 1.0 MG/DL  
 ALT (SGPT) 20 13 - 56 U/L  
 AST (SGOT) 12 (L) 15 - 37 U/L Alk. phosphatase 53 45 - 117 U/L Protein, total 7.1 6.4 - 8.2 g/dL Albumin 4.0 3.4 - 5.0 g/dL Globulin 3.1 2.0 - 4.0 g/dL A-G Ratio 1.3 0.8 - 1.7 PROTHROMBIN TIME + INR Collection Time: 04/03/19  9:30 PM  
Result Value Ref Range Prothrombin time 14.1 11.5 - 15.2 sec INR 1.1 0.8 - 1.2 HCG QL SERUM Collection Time: 04/03/19  9:30 PM  
Result Value Ref Range HCG, Ql. NEGATIVE  NEG    
TSH 3RD GENERATION Collection Time: 04/03/19  9:30 PM  
Result Value Ref Range TSH 0.69 0.36 - 3.74 uIU/mL URINALYSIS W/ RFLX MICROSCOPIC Collection Time: 04/03/19  9:40 PM  
Result Value Ref Range Color YELLOW Appearance CLOUDY Specific gravity 1.014 1.005 - 1.030    
 pH (UA) 7.5 5.0 - 8.0 Protein NEGATIVE  NEG mg/dL Glucose NEGATIVE  NEG mg/dL Ketone NEGATIVE  NEG mg/dL Bilirubin NEGATIVE  NEG Blood SMALL (A) NEG Urobilinogen 0.2 0.2 - 1.0 EU/dL Nitrites NEGATIVE  NEG Leukocyte Esterase NEGATIVE  NEG    
URINE MICROSCOPIC ONLY Collection Time: 04/03/19  9:40 PM  
Result Value Ref Range WBC 0 to 2 0 - 4 /hpf  
 RBC 0 to 2 0 - 5 /hpf Epithelial cells FEW 0 - 5 /lpf Bacteria NEGATIVE  NEG /hpf Amorphous Crystals 3+ (A) NEG PROGRESS NOTE:  
9:28 PM  
Initial assessment completed. Written by Dannielle Biswas NP-C 
 
DISCHARGE NOTE: 
 
Sotoaudie Winslow Raymond's  results have been reviewed with her. She has been counseled regarding her diagnosis, treatment, and plan.   She verbally conveys understanding and agreement of the signs, symptoms, diagnosis, treatment and prognosis and additionally agrees to follow up as discussed. She also agrees with the care-plan and conveys that all of her questions have been answered. I have also provided discharge instructions for her that include: educational information regarding their diagnosis and treatment, and list of reasons why they would want to return to the ED prior to their follow-up appointment, should her condition change. CLINICAL IMPRESSION: 
 
1. Abdominal pain, generalized 2. Weight loss, unintentional   
3. Elevated blood pressure reading AFTER VISIT PLAN: 
 
Discharge Medication List as of 4/3/2019 11:32 PM  
  
START taking these medications Details  
dicyclomine (BENTYL) 20 mg tablet Take 1 Tab by mouth every six (6) hours for 20 doses. , Print, Disp-20 Tab, R-0  
  
ondansetron hcl (ZOFRAN) 4 mg tablet Take 1 Tab by mouth every eight (8) hours as needed for Nausea. , Print, Disp-12 Tab, R-0  
  
  
CONTINUE these medications which have NOT CHANGED Details  
liothyronine (CYTOMEL) 5 mcg tablet Take 5 mcg by mouth daily. , Historical Med  
  
levothyroxine (SYNTHROID) 25 mcg tablet Take  by mouth Daily (before breakfast). , Historical Med Follow-up Information Follow up With Specialties Details Why Contact Info Darell Felix NP Nurse Practitioner Schedule an appointment as soon as possible for a visit in 1 day Further evaluation [facility] [phone] Abi Keith MD Gastroenterology, Internal Medicine Schedule an appointment as soon as possible for a visit in 1 day Further evaluation -   GI [address] [facility] [address] [id number] 
[phone] Written by Flakito BLACK

## 2019-04-11 ENCOUNTER — HOSPITAL ENCOUNTER (OUTPATIENT)
Dept: CT IMAGING | Age: 34
Discharge: HOME | End: 2019-04-11
Attending: NURSE PRACTITIONER
Payer: OTHER GOVERNMENT

## 2019-04-11 DIAGNOSIS — R10.84 ABDOMINAL PAIN, GENERALIZED: ICD-10-CM

## 2019-04-11 DIAGNOSIS — R19.7 DIARRHEA: ICD-10-CM

## 2019-04-11 PROCEDURE — 74011636320 HC RX REV CODE- 636/320: Performed by: RADIOLOGY

## 2019-04-11 PROCEDURE — 74177 CT ABD & PELVIS W/CONTRAST: CPT

## 2019-04-11 RX ADMIN — IOPAMIDOL 96 ML: 612 INJECTION, SOLUTION INTRAVENOUS at 14:42

## 2019-04-11 RX ADMIN — IOHEXOL 50 ML: 240 INJECTION, SOLUTION INTRATHECAL; INTRAVASCULAR; INTRAVENOUS; ORAL at 14:41

## 2019-04-17 ENCOUNTER — HOSPITAL ENCOUNTER (OUTPATIENT)
Dept: GENERAL RADIOLOGY | Age: 34
Discharge: HOME | End: 2019-04-17
Payer: OTHER GOVERNMENT

## 2019-04-17 DIAGNOSIS — R93.89 ABNORMAL FINDING ON IMAGING: ICD-10-CM

## 2019-04-17 PROCEDURE — 74011000255 HC RX REV CODE- 255

## 2019-04-17 PROCEDURE — 74250 X-RAY XM SM INT 1CNTRST STD: CPT

## 2019-04-17 RX ADMIN — BARIUM SULFATE 600 ML: 240 SUSPENSION ORAL at 10:17

## 2019-05-13 ENCOUNTER — HOSPITAL ENCOUNTER (EMERGENCY)
Age: 34
Discharge: HOME | End: 2019-05-13
Attending: EMERGENCY MEDICINE
Payer: OTHER GOVERNMENT

## 2019-05-13 VITALS
OXYGEN SATURATION: 98 % | TEMPERATURE: 98.7 F | DIASTOLIC BLOOD PRESSURE: 89 MMHG | RESPIRATION RATE: 16 BRPM | SYSTOLIC BLOOD PRESSURE: 133 MMHG | HEART RATE: 86 BPM

## 2019-05-13 DIAGNOSIS — T63.301A SPIDER BITE WOUND, ACCIDENTAL OR UNINTENTIONAL, INITIAL ENCOUNTER: Primary | ICD-10-CM

## 2019-05-13 PROCEDURE — 99282 EMERGENCY DEPT VISIT SF MDM: CPT

## 2019-05-13 RX ORDER — CEPHALEXIN 500 MG/1
500 CAPSULE ORAL 4 TIMES DAILY
Qty: 40 CAP | Refills: 0 | Status: SHIPPED | OUTPATIENT
Start: 2019-05-13 | End: 2019-05-23

## 2019-05-13 NOTE — ED PROVIDER NOTES
EMERGENCY DEPARTMENT HISTORY AND PHYSICAL EXAM 
 
Date: 5/13/2019 Patient Name: [place] Nguyen History of Presenting Illness Chief Complaint Patient presents with  Insect Bite History Provided By: Patient Additional History (Context): Florida Nguyen is a 30 y.o. female with No significant past medical history who presents with left hip spider bite wound that occurred this afternoon. Denies history of same or MRSA. LNMP 3 weeks ago. PCP: Char Blancas NP Current Outpatient Medications Medication Sig Dispense Refill  cephALEXin (KEFLEX) 500 mg capsule Take 1 Cap by mouth four (4) times daily for 10 days. 40 Cap 0  
 diphenhydrAMINE-zinc acetate 2%-0.1% (BENADRYL EXTRA STRENGTH) 2-0.1 % topical cream Apply  to affected area two (2) times daily as needed for Itching. 30 g 0  
 liothyronine (CYTOMEL) 5 mcg tablet Take 5 mcg by mouth daily.  levothyroxine (SYNTHROID) 25 mcg tablet Take  by mouth Daily (before breakfast).  ondansetron hcl (ZOFRAN) 4 mg tablet Take 1 Tab by mouth every eight (8) hours as needed for Nausea. 12 Tab 0 Past History Past Medical History: 
Past Medical History:  
Diagnosis Date  Gluten intolerance  Hyperthyroidism Past Surgical History: 
Past Surgical History:  
Procedure Laterality Date  HX GYN Family History: No family history on file. Social History: 
Social History Tobacco Use  Smoking status: Never Smoker  Smokeless tobacco: Never Used Substance Use Topics  Alcohol use: Yes Comment: rarely  Drug use: No  
 
 
Allergies: Allergies Allergen Reactions  Percocet [Oxycodone-Acetaminophen] Other (comments)  
  seizure Review of Systems Review of Systems Constitutional: Negative for fever. Skin: Positive for wound. All Other Systems Negative Physical Exam  
 
Vitals:  
 05/13/19 1930 BP: 133/89 Pulse: 86 Resp: 16 Temp: 98.7 °F (37.1 °C) SpO2: 98% Physical Exam  
Constitutional: She is oriented to person, place, and time. She appears well-developed. HENT:  
Head: Normocephalic and atraumatic. Eyes: Pupils are equal, round, and reactive to light. Neck: No JVD present. No tracheal deviation present. No thyromegaly present. Cardiovascular: Normal rate, regular rhythm and normal heart sounds. Exam reveals no gallop and no friction rub. No murmur heard. Pulmonary/Chest: Effort normal and breath sounds normal. No stridor. No respiratory distress. She has no wheezes. She has no rales. She exhibits no tenderness. Abdominal: Soft. She exhibits no distension and no mass. There is no tenderness. There is no rebound and no guarding. Musculoskeletal: She exhibits no edema or tenderness. Lymphadenopathy:  
  She has no cervical adenopathy. Neurological: She is alert and oriented to person, place, and time. Skin: Skin is warm and dry. Rash noted. No erythema. No pallor. Left lateral hip bite miles puncture wounds with mild swelling and tenderness. No warmth redness or streaking noted. Not ulcerated. Area of swelling approximately 1 cm diameter. Psychiatric: She has a normal mood and affect. Her behavior is normal. Thought content normal.  
Nursing note and vitals reviewed. Diagnostic Study Results Labs - No results found for this or any previous visit (from the past 12 hour(s)). Radiologic Studies - No orders to display CT Results  (Last 48 hours) None CXR Results  (Last 48 hours) None Medical Decision Making I am the first provider for this patient. I reviewed the vital signs, available nursing notes, past medical history, past surgical history, family history and social history. Vital Signs-Reviewed the patient's vital signs. Records Reviewed: Nursing Notes Procedures: 
Procedures Provider Notes (Medical Decision Making): No signs of infection will give prophylaxis Keflex and topical Benadryl. MED RECONCILIATION: 
No current facility-administered medications for this encounter. Current Outpatient Medications Medication Sig  cephALEXin (KEFLEX) 500 mg capsule Take 1 Cap by mouth four (4) times daily for 10 days.  diphenhydrAMINE-zinc acetate 2%-0.1% (BENADRYL EXTRA STRENGTH) 2-0.1 % topical cream Apply  to affected area two (2) times daily as needed for Itching.  liothyronine (CYTOMEL) 5 mcg tablet Take 5 mcg by mouth daily.  levothyroxine (SYNTHROID) 25 mcg tablet Take  by mouth Daily (before breakfast).  ondansetron hcl (ZOFRAN) 4 mg tablet Take 1 Tab by mouth every eight (8) hours as needed for Nausea. Disposition: 
home DISCHARGE NOTE:  
7:36 PM 
 
Pt has been reexamined. Patient has no new complaints, changes, or physical findings. Care plan outlined and precautions discussed. Results of exam were reviewed with the patient. All medications were reviewed with the patient; will d/c home with keflex, benadryl. All of pt's questions and concerns were addressed. Patient was instructed and agrees to follow up with PCP, as well as to return to the ED upon further deterioration. Patient is ready to go home. Follow-up Information Follow up With Specialties Details Why Contact Kristel Kaur NP Nurse Practitioner Schedule an appointment as soon as possible for a visit in 2 days As needed, [phone] [facility] EMERGENCY DEPT Emergency Medicine  If symptoms worsen return immediately [phone] 
[phone] Current Discharge Medication List  
  
START taking these medications Details  
cephALEXin (KEFLEX) 500 mg capsule Take 1 Cap by mouth four (4) times daily for 10 days.  
Qty: 40 Cap, Refills: 0  
  
diphenhydrAMINE-zinc acetate 2%-0.1% (BENADRYL EXTRA STRENGTH) 2-0.1 % topical cream Apply  to affected area two (2) times daily as needed for Itching. Qty: 30 g, Refills: 0 Diagnosis Clinical Impression: 1. Spider bite wound, accidental or unintentional, initial encounter

## 2019-05-13 NOTE — DISCHARGE INSTRUCTIONS
Patient Education        Spider Bite or Scorpion Sting: Care Instructions  Your Care Instructions    Spider bites and scorpion stings often cause minor swelling, redness, pain, and itching. These mild symptoms are common and may last from a few hours to a few days. Some people have more severe reactions. Home treatment is often all that you need to relieve symptoms. Follow-up care is a key part of your treatment and safety. Be sure to make and go to all appointments, and call your doctor if you are having problems. It's also a good idea to know your test results and keep a list of the medicines you take. How can you care for yourself at home? · Put ice or a cold pack on the area for 10 to 20 minutes at a time. Put a thin cloth between the ice and your skin. · Try an over-the-counter medicine for itching, redness, swelling, and pain. Read and follow all instructions on the label. ? Take an antihistamine, such as diphenhydramine (Benadryl) or loratadine (Claritin). ? Put a hydrocortisone 1% cream or calamine lotion on the skin. · Don't scratch or rub the skin around the area. When should you call for help? Call 911 anytime you think you may need emergency care. For example, call if:    · You passed out (lost consciousness).     · You have a seizure.     · You have trouble breathing.    Call your doctor now or seek immediate medical care if:    · You have signs of infection, such as:  ? Increased pain, swelling, warmth, or redness around the bite or sting. ? Red streaks leading from the area. ? Pus draining from the area. ? A fever.     · You get a blister or sore at the bite or sting area, or the area turns purple.    Watch closely for changes in your health, and be sure to contact your doctor if:    · You have pain or burning at the area after 2 days of home treatment.     · You have symptoms for more than 1 week. Where can you learn more? Go to http://becca.info/.   Enter P595 in the search box to learn more about \"Spider Bite or Scorpion Sting: Care Instructions. \"  Current as of: September 23, 2018  Content Version: 11.9  © 2294-5641 [employer]. Care instructions adapted under license by [employer] (which disclaims liability or warranty for this information). If you have questions about a medical condition or this instruction, always ask your healthcare professional. [place] [address] any warranty or liability for your use of this information.

## 2019-06-23 ENCOUNTER — HOSPITAL ENCOUNTER (EMERGENCY)
Age: 34
Discharge: HOME | End: 2019-06-23
Attending: EMERGENCY MEDICINE | Admitting: EMERGENCY MEDICINE
Payer: OTHER GOVERNMENT

## 2019-06-23 VITALS
WEIGHT: 91.3 LBS | OXYGEN SATURATION: 100 % | DIASTOLIC BLOOD PRESSURE: 78 MMHG | HEIGHT: 68 IN | RESPIRATION RATE: 19 BRPM | HEART RATE: 64 BPM | SYSTOLIC BLOOD PRESSURE: 124 MMHG | TEMPERATURE: 98.7 F | BODY MASS INDEX: 13.84 KG/M2

## 2019-06-23 DIAGNOSIS — K52.9 CHRONIC DIARRHEA: ICD-10-CM

## 2019-06-23 DIAGNOSIS — G89.29 CHRONIC ABDOMINAL PAIN: Primary | ICD-10-CM

## 2019-06-23 DIAGNOSIS — R10.9 CHRONIC ABDOMINAL PAIN: Primary | ICD-10-CM

## 2019-06-23 DIAGNOSIS — R11.2 NON-INTRACTABLE VOMITING WITH NAUSEA, UNSPECIFIED VOMITING TYPE: ICD-10-CM

## 2019-06-23 LAB
ALBUMIN SERPL-MCNC: 5.4 G/DL (ref 3.4–5)
ALBUMIN/GLOB SERPL: 1.5 {RATIO} (ref 0.8–1.7)
ALP SERPL-CCNC: 59 U/L (ref 45–117)
ALT SERPL-CCNC: 30 U/L (ref 13–56)
ANION GAP SERPL CALC-SCNC: 7 MMOL/L (ref 3–18)
APPEARANCE UR: CLEAR
AST SERPL-CCNC: 15 U/L (ref 15–37)
BACTERIA URNS QL MICRO: ABNORMAL /HPF
BASOPHILS # BLD: 0.1 K/UL (ref 0–0.1)
BASOPHILS NFR BLD: 1 % (ref 0–2)
BILIRUB SERPL-MCNC: 0.7 MG/DL (ref 0.2–1)
BILIRUB UR QL: NEGATIVE
BUN SERPL-MCNC: 12 MG/DL (ref 7–18)
BUN/CREAT SERPL: 13 (ref 12–20)
CALCIUM SERPL-MCNC: 8.9 MG/DL (ref 8.5–10.1)
CHLORIDE SERPL-SCNC: 106 MMOL/L (ref 100–108)
CK MB CFR SERPL CALC: NORMAL % (ref 0–4)
CK MB SERPL-MCNC: <1 NG/ML (ref 5–25)
CK SERPL-CCNC: 141 U/L (ref 26–192)
CO2 SERPL-SCNC: 24 MMOL/L (ref 21–32)
COLOR UR: YELLOW
CREAT SERPL-MCNC: 0.93 MG/DL (ref 0.6–1.3)
DIFFERENTIAL METHOD BLD: ABNORMAL
EOSINOPHIL # BLD: 0.4 K/UL (ref 0–0.4)
EOSINOPHIL NFR BLD: 7 % (ref 0–5)
EPITH CASTS URNS QL MICRO: ABNORMAL /LPF (ref 0–5)
ERYTHROCYTE [DISTWIDTH] IN BLOOD BY AUTOMATED COUNT: 13.3 % (ref 11.6–14.5)
GLOBULIN SER CALC-MCNC: 3.7 G/DL (ref 2–4)
GLUCOSE SERPL-MCNC: 86 MG/DL (ref 74–99)
GLUCOSE UR STRIP.AUTO-MCNC: NEGATIVE MG/DL
HCG UR QL: NEGATIVE
HCT VFR BLD AUTO: 48.8 % (ref 35–45)
HGB BLD-MCNC: 16.3 G/DL (ref 12–16)
HGB UR QL STRIP: ABNORMAL
KETONES UR QL STRIP.AUTO: 40 MG/DL
LEUKOCYTE ESTERASE UR QL STRIP.AUTO: NEGATIVE
LYMPHOCYTES # BLD: 1.8 K/UL (ref 0.9–3.6)
LYMPHOCYTES NFR BLD: 27 % (ref 21–52)
MCH RBC QN AUTO: 31 PG (ref 24–34)
MCHC RBC AUTO-ENTMCNC: 33.4 G/DL (ref 31–37)
MCV RBC AUTO: 93 FL (ref 74–97)
MONOCYTES # BLD: 0.5 K/UL (ref 0.05–1.2)
MONOCYTES NFR BLD: 7 % (ref 3–10)
NEUTS SEG # BLD: 3.9 K/UL (ref 1.8–8)
NEUTS SEG NFR BLD: 58 % (ref 40–73)
NITRITE UR QL STRIP.AUTO: NEGATIVE
PH UR STRIP: 5 [PH] (ref 5–8)
PLATELET # BLD AUTO: 245 K/UL (ref 135–420)
PMV BLD AUTO: 13.8 FL (ref 9.2–11.8)
POTASSIUM SERPL-SCNC: 4 MMOL/L (ref 3.5–5.5)
PROT SERPL-MCNC: 9.1 G/DL (ref 6.4–8.2)
PROT UR STRIP-MCNC: NEGATIVE MG/DL
RBC # BLD AUTO: 5.25 M/UL (ref 4.2–5.3)
RBC #/AREA URNS HPF: ABNORMAL /HPF (ref 0–5)
SODIUM SERPL-SCNC: 137 MMOL/L (ref 136–145)
SP GR UR REFRACTOMETRY: 1.03 (ref 1–1.03)
T3FREE SERPL-MCNC: 2.8 PG/ML (ref 2.18–3.98)
T4 FREE SERPL-MCNC: 1.2 NG/DL (ref 0.7–1.5)
TROPONIN I SERPL-MCNC: <0.02 NG/ML (ref 0–0.04)
TSH SERPL DL<=0.05 MIU/L-ACNC: 0.79 UIU/ML (ref 0.36–3.74)
UROBILINOGEN UR QL STRIP.AUTO: 1 EU/DL (ref 0.2–1)
WBC # BLD AUTO: 6.6 K/UL (ref 4.6–13.2)
WBC URNS QL MICRO: ABNORMAL /HPF (ref 0–4)

## 2019-06-23 PROCEDURE — 96361 HYDRATE IV INFUSION ADD-ON: CPT

## 2019-06-23 PROCEDURE — 74011250636 HC RX REV CODE- 250/636: Performed by: EMERGENCY MEDICINE

## 2019-06-23 PROCEDURE — 96374 THER/PROPH/DIAG INJ IV PUSH: CPT

## 2019-06-23 PROCEDURE — 81001 URINALYSIS AUTO W/SCOPE: CPT

## 2019-06-23 PROCEDURE — 84439 ASSAY OF FREE THYROXINE: CPT

## 2019-06-23 PROCEDURE — 84443 ASSAY THYROID STIM HORMONE: CPT

## 2019-06-23 PROCEDURE — 99284 EMERGENCY DEPT VISIT MOD MDM: CPT

## 2019-06-23 PROCEDURE — 80053 COMPREHEN METABOLIC PANEL: CPT

## 2019-06-23 PROCEDURE — 94762 N-INVAS EAR/PLS OXIMTRY CONT: CPT

## 2019-06-23 PROCEDURE — 81025 URINE PREGNANCY TEST: CPT

## 2019-06-23 PROCEDURE — 84481 FREE ASSAY (FT-3): CPT

## 2019-06-23 PROCEDURE — 85025 COMPLETE CBC W/AUTO DIFF WBC: CPT

## 2019-06-23 PROCEDURE — 82550 ASSAY OF CK (CPK): CPT

## 2019-06-23 RX ORDER — ONDANSETRON 2 MG/ML
4 INJECTION INTRAMUSCULAR; INTRAVENOUS
Status: COMPLETED | OUTPATIENT
Start: 2019-06-23 | End: 2019-06-23

## 2019-06-23 RX ADMIN — SODIUM CHLORIDE 1000 ML: 900 INJECTION, SOLUTION INTRAVENOUS at 18:45

## 2019-06-23 RX ADMIN — ONDANSETRON 4 MG: 2 INJECTION INTRAMUSCULAR; INTRAVENOUS at 18:45

## 2019-06-23 NOTE — ED PROVIDER NOTES
Humberto Zambrano is a 30 y.o. Female with at least 2-year history of chronic abdominal pain, nausea and vomiting and diarrhea with abnormal vaginal bleeding with complaints of concern over over her continued symptoms. Patient has had a colonoscopy which showed a inflammatory or some type of infectious process and was placed on steroids by GI doctor which she is about to finish. Patient says she has daily vaginal spotting but no blood in her stool. She does have multiple stools a day. Patient is able to take food in and liquids in. She also has increased burning and reflux symptoms as well but is not currently on a PPI. She has had no fever. She does feels generally weak and not well. Patient is also followed by an endocrinologist for her thyroid disease as well. She is also recently seen a gynecologist regarding her abnormal bleeding. The history is provided by the patient and medical records. Past Medical History:   Diagnosis Date    Gastric ulcer     Gluten intolerance     Hashimoto's disease     Hyperthyroidism     Psychiatric disorder        Past Surgical History:   Procedure Laterality Date    HX GYN           History reviewed. No pertinent family history.     Social History     Socioeconomic History    Marital status:      Spouse name: Not on file    Number of children: Not on file    Years of education: Not on file    Highest education level: Not on file   Occupational History    Not on file   Social Needs    Financial resource strain: Not on file    Food insecurity:     Worry: Not on file     Inability: Not on file    Transportation needs:     Medical: Not on file     Non-medical: Not on file   Tobacco Use    Smoking status: Never Smoker    Smokeless tobacco: Never Used   Substance and Sexual Activity    Alcohol use: Yes     Comment: rarely    Drug use: No    Sexual activity: Not on file   Lifestyle    Physical activity:     Days per week: Not on file     Minutes per session: Not on file    Stress: Not on file   Relationships    Social connections:     Talks on phone: Not on file     Gets together: Not on file     Attends Judaism service: Not on file     Active member of club or organization: Not on file     Attends meetings of clubs or organizations: Not on file     Relationship status: Not on file    Intimate partner violence:     Fear of current or ex partner: Not on file     Emotionally abused: Not on file     Physically abused: Not on file     Forced sexual activity: Not on file   Other Topics Concern    Not on file   Social History Narrative    Not on file         ALLERGIES: Percocet [oxycodone-acetaminophen]    Review of Systems   Constitutional: Positive for fatigue. Negative for diaphoresis and fever. HENT: Negative for sore throat and trouble swallowing. Eyes: Negative for visual disturbance. Respiratory: Negative for shortness of breath. Cardiovascular: Negative for chest pain. Gastrointestinal: Positive for abdominal pain. Patient has daily abdominal pain but has not been as bad and has not had to use her Bentyl in a while. She does use Zofran about once to twice a week on occasion   Endocrine: Negative for polyuria. Genitourinary: Positive for frequency and vaginal bleeding. Negative for difficulty urinating and hematuria. Musculoskeletal: Negative for gait problem. Skin: Negative for rash. Allergic/Immunologic: Negative for immunocompromised state. Neurological: Positive for weakness. Psychiatric/Behavioral: Positive for sleep disturbance. Vitals:    06/23/19 1830 06/23/19 1845 06/23/19 1900 06/23/19 1915   BP: 133/89 132/75 152/87 131/61   Pulse: 72 69 62 74   Resp: 17 18 10 17   Temp:       SpO2: 100% 100% 100% 100%   Weight:       Height:                Physical Exam   Constitutional: She is oriented to person, place, and time. She appears well-developed and well-nourished. Non-toxic appearance.  She does not have a sickly appearance. She does not appear ill. No distress. HENT:   Head: Normocephalic and atraumatic. Right Ear: External ear normal.   Left Ear: External ear normal.   Nose: Nose normal.   Mouth/Throat: Uvula is midline, oropharynx is clear and moist and mucous membranes are normal. No oropharyngeal exudate. Eyes: Conjunctivae are normal. No scleral icterus. Neck: Neck supple. Cardiovascular: Normal rate, regular rhythm, normal heart sounds and intact distal pulses. Pulmonary/Chest: Effort normal and breath sounds normal.   Abdominal: Soft. There is no tenderness. Musculoskeletal: She exhibits no edema. Neurological: She is alert and oriented to person, place, and time. Gait normal.   Skin: Skin is warm and dry. Capillary refill takes less than 2 seconds. She is not diaphoretic. Psychiatric: Her behavior is normal.   Nursing note and vitals reviewed.        MDM       Procedures    Vitals:  Patient Vitals for the past 12 hrs:   Temp Pulse Resp BP SpO2   06/23/19 1915  74 17 131/61 100 %   06/23/19 1900  62 10 152/87 100 %   06/23/19 1845  69 18 132/75 100 %   06/23/19 1830  72 17 133/89 100 %   06/23/19 1755 98.7 °F (37.1 °C) 100 16 (!) 156/100 100 %         Medications ordered:   Medications   sodium chloride 0.9 % bolus infusion 1,000 mL (1,000 mL IntraVENous New Bag 6/23/19 1845)   ondansetron (ZOFRAN) injection 4 mg (4 mg IntraVENous Given 6/23/19 1845)         Lab findings:  Recent Results (from the past 12 hour(s))   CBC WITH AUTOMATED DIFF    Collection Time: 06/23/19  6:15 PM   Result Value Ref Range    WBC 6.6 4.6 - 13.2 K/uL    RBC 5.25 4.20 - 5.30 M/uL    HGB 16.3 (H) 12.0 - 16.0 g/dL    HCT 48.8 (H) 35.0 - 45.0 %    MCV 93.0 74.0 - 97.0 FL    MCH 31.0 24.0 - 34.0 PG    MCHC 33.4 31.0 - 37.0 g/dL    RDW 13.3 11.6 - 14.5 %    PLATELET 960 710 - 295 K/uL    MPV 13.8 (H) 9.2 - 11.8 FL    NEUTROPHILS 58 40 - 73 %    LYMPHOCYTES 27 21 - 52 %    MONOCYTES 7 3 - 10 %    EOSINOPHILS 7 (H) 0 - 5 %    BASOPHILS 1 0 - 2 %    ABS. NEUTROPHILS 3.9 1.8 - 8.0 K/UL    ABS. LYMPHOCYTES 1.8 0.9 - 3.6 K/UL    ABS. MONOCYTES 0.5 0.05 - 1.2 K/UL    ABS. EOSINOPHILS 0.4 0.0 - 0.4 K/UL    ABS. BASOPHILS 0.1 0.0 - 0.1 K/UL    DF AUTOMATED     METABOLIC PANEL, COMPREHENSIVE    Collection Time: 06/23/19  6:15 PM   Result Value Ref Range    Sodium 137 136 - 145 mmol/L    Potassium 4.0 3.5 - 5.5 mmol/L    Chloride 106 100 - 108 mmol/L    CO2 24 21 - 32 mmol/L    Anion gap 7 3.0 - 18 mmol/L    Glucose 86 74 - 99 mg/dL    BUN 12 7.0 - 18 MG/DL    Creatinine 0.93 0.6 - 1.3 MG/DL    BUN/Creatinine ratio 13 12 - 20      GFR est AA >60 >60 ml/min/1.73m2    GFR est non-AA >60 >60 ml/min/1.73m2    Calcium 8.9 8.5 - 10.1 MG/DL    Bilirubin, total 0.7 0.2 - 1.0 MG/DL    ALT (SGPT) 30 13 - 56 U/L    AST (SGOT) 15 15 - 37 U/L    Alk.  phosphatase 59 45 - 117 U/L    Protein, total 9.1 (H) 6.4 - 8.2 g/dL    Albumin 5.4 (H) 3.4 - 5.0 g/dL    Globulin 3.7 2.0 - 4.0 g/dL    A-G Ratio 1.5 0.8 - 1.7     TSH 3RD GENERATION    Collection Time: 06/23/19  6:15 PM   Result Value Ref Range    TSH 0.79 0.36 - 3.74 uIU/mL   T4, FREE    Collection Time: 06/23/19  6:15 PM   Result Value Ref Range    T4, Free 1.2 0.7 - 1.5 NG/DL   T3, FREE    Collection Time: 06/23/19  6:15 PM   Result Value Ref Range    Triiodothyronine (T3), free 2.8 2.18 - 3.98 PG/ML   CARDIAC PANEL,(CK, CKMB & TROPONIN)    Collection Time: 06/23/19  6:15 PM   Result Value Ref Range     26 - 192 U/L    CK - MB <1.0 <3.6 ng/ml    CK-MB Index  0.0 - 4.0 %     CALCULATION NOT PERFORMED WHEN RESULT IS BELOW LINEAR LIMIT    Troponin-I, QT <0.02 0.0 - 0.045 NG/ML   URINALYSIS W/ RFLX MICROSCOPIC    Collection Time: 06/23/19  6:18 PM   Result Value Ref Range    Color YELLOW      Appearance CLEAR      Specific gravity 1.027 1.005 - 1.030      pH (UA) 5.0 5.0 - 8.0      Protein NEGATIVE  NEG mg/dL    Glucose NEGATIVE  NEG mg/dL    Ketone 40 (A) NEG mg/dL    Bilirubin NEGATIVE  NEG Blood TRACE (A) NEG      Urobilinogen 1.0 0.2 - 1.0 EU/dL    Nitrites NEGATIVE  NEG      Leukocyte Esterase NEGATIVE  NEG     HCG URINE, QL    Collection Time: 06/23/19  6:18 PM   Result Value Ref Range    HCG urine, QL NEGATIVE  NEG     URINE MICROSCOPIC ONLY    Collection Time: 06/23/19  6:18 PM   Result Value Ref Range    WBC 0 to 3 0 - 4 /hpf    RBC 0 to 3 0 - 5 /hpf    Epithelial cells 2+ 0 - 5 /lpf    Bacteria FEW (A) NEG /hpf       EKG interpretation by ED Physician:      X-Ray, CT or other radiology findings or impressions:  No orders to display       Progress notes, Consult notes or additional Procedure notes: There is no obvious signs of infection. Do not feel patient requires any imaging or other work-up at this time. Patient needs to follow back up with her GI specialist and should consider seeing a rheumatologist as well for work-up of further autoimmune issues. I have discussed with patient and/or family/sig other the results, interpretation of any imaging if performed, suspected diagnosis and treatment plan to include instructions regarding the diagnoses listed to which understanding was expressed with all questions answered      Reevaluation of patient:   stable    Disposition:  Diagnosis:   1. Chronic abdominal pain    2. Non-intractable vomiting with nausea, unspecified vomiting type    3.  Chronic diarrhea        Disposition: home    Follow-up Information     Follow up With Specialties Details Why Contact Info    Call your GI specialist in the morning regarding your symptoms and recommend follow-up within the next week        Octavio Downey NP Nurse Practitioner Schedule an appointment as soon as possible for a visit  [address] [facility] [phone]      [facility] AT THE [facility]  Schedule an appointment as soon as possible for a visit  [address] [facility]  [address] Rheumatology   [phone] [facility]  [phone]            Patient's Medications   Start Taking    No medications on file   Continue Taking    LEVOTHYROXINE (SYNTHROID) 25 MCG TABLET    Take  by mouth Daily (before breakfast). LIOTHYRONINE (CYTOMEL) 5 MCG TABLET    Take 5 mcg by mouth daily. These Medications have changed    No medications on file   Stop Taking    DIPHENHYDRAMINE-ZINC ACETATE 2%-0.1% (BENADRYL EXTRA STRENGTH) 2-0.1 % TOPICAL CREAM    Apply  to affected area two (2) times daily as needed for Itching. ONDANSETRON HCL (ZOFRAN) 4 MG TABLET    Take 1 Tab by mouth every eight (8) hours as needed for Nausea.

## 2019-06-23 NOTE — ED TRIAGE NOTES
Multiple complaints. Sleeps all time. Vomiting and diarrhea. Has ulcer, thyroid dx. \" I am very sick\". \"I don't have an eating disorder\".

## 2019-06-23 NOTE — DISCHARGE INSTRUCTIONS
Patient Education   It is recommended you attempt to follow-up with a rheumatologist for further work-up for possible other disease processes causing your symptoms     Abdominal Pain: Care Instructions  Your Care Instructions    Abdominal pain has many possible causes. Some aren't serious and get better on their own in a few days. Others need more testing and treatment. If your pain continues or gets worse, you need to be rechecked and may need more tests to find out what is wrong. You may need surgery to correct the problem. Don't ignore new symptoms, such as fever, nausea and vomiting, urination problems, pain that gets worse, and dizziness. These may be signs of a more serious problem. Your doctor may have recommended a follow-up visit in the next 8 to 12 hours. If you are not getting better, you may need more tests or treatment. The doctor has checked you carefully, but problems can develop later. If you notice any problems or new symptoms, get medical treatment right away. Follow-up care is a key part of your treatment and safety. Be sure to make and go to all appointments, and call your doctor if you are having problems. It's also a good idea to know your test results and keep a list of the medicines you take. How can you care for yourself at home? · Rest until you feel better. · To prevent dehydration, drink plenty of fluids, enough so that your urine is light yellow or clear like water. Choose water and other caffeine-free clear liquids until you feel better. If you have kidney, heart, or liver disease and have to limit fluids, talk with your doctor before you increase the amount of fluids you drink. · If your stomach is upset, eat mild foods, such as rice, dry toast or crackers, bananas, and applesauce. Try eating several small meals instead of two or three large ones.   · Wait until 48 hours after all symptoms have gone away before you have spicy foods, alcohol, and drinks that contain caffeine. · Do not eat foods that are high in fat. · Avoid anti-inflammatory medicines such as aspirin, ibuprofen (Advil, Motrin), and naproxen (Aleve). These can cause stomach upset. Talk to your doctor if you take daily aspirin for another health problem. When should you call for help? Call 911 anytime you think you may need emergency care. For example, call if:    · You passed out (lost consciousness).     · You pass maroon or very bloody stools.     · You vomit blood or what looks like coffee grounds.     · You have new, severe belly pain.    Call your doctor now or seek immediate medical care if:    · Your pain gets worse, especially if it becomes focused in one area of your belly.     · You have a new or higher fever.     · Your stools are black and look like tar, or they have streaks of blood.     · You have unexpected vaginal bleeding.     · You have symptoms of a urinary tract infection. These may include:  ? Pain when you urinate. ? Urinating more often than usual.  ? Blood in your urine.     · You are dizzy or lightheaded, or you feel like you may faint.    Watch closely for changes in your health, and be sure to contact your doctor if:    · You are not getting better after 1 day (24 hours). Where can you learn more? Go to http://georges-omero.info/. Enter I897 in the search box to learn more about \"Abdominal Pain: Care Instructions. \"  Current as of: September 23, 2018  Content Version: 11.9  © 2503-2298 [employer]. Care instructions adapted under license by [employer] (which disclaims liability or warranty for this information). If you have questions about a medical condition or this instruction, always ask your healthcare professional. [address] any warranty or liability for your use of this information.

## 2019-06-23 NOTE — ED NOTES
I have reviewed discharge instructions with the patient. The patient verbalized understanding.  Patient desired to keep armband

## 2019-07-17 ENCOUNTER — APPOINTMENT (OUTPATIENT)
Dept: ULTRASOUND IMAGING | Age: 34
End: 2019-07-17
Attending: EMERGENCY MEDICINE
Payer: OTHER GOVERNMENT

## 2019-07-17 ENCOUNTER — APPOINTMENT (OUTPATIENT)
Dept: GENERAL RADIOLOGY | Age: 34
End: 2019-07-17
Attending: EMERGENCY MEDICINE
Payer: OTHER GOVERNMENT

## 2019-07-17 ENCOUNTER — HOSPITAL ENCOUNTER (EMERGENCY)
Age: 34
Discharge: HOME | End: 2019-07-17
Attending: EMERGENCY MEDICINE
Payer: OTHER GOVERNMENT

## 2019-07-17 VITALS
OXYGEN SATURATION: 100 % | RESPIRATION RATE: 16 BRPM | BODY MASS INDEX: 15.2 KG/M2 | WEIGHT: 100.3 LBS | TEMPERATURE: 97.8 F | HEART RATE: 87 BPM | DIASTOLIC BLOOD PRESSURE: 78 MMHG | HEIGHT: 68 IN | SYSTOLIC BLOOD PRESSURE: 112 MMHG

## 2019-07-17 DIAGNOSIS — G89.29 ABDOMINAL PAIN, CHRONIC, LEFT LOWER QUADRANT: Primary | ICD-10-CM

## 2019-07-17 DIAGNOSIS — I88.9 LYMPHADENITIS: ICD-10-CM

## 2019-07-17 DIAGNOSIS — R10.32 ABDOMINAL PAIN, CHRONIC, LEFT LOWER QUADRANT: Primary | ICD-10-CM

## 2019-07-17 LAB
ALBUMIN SERPL-MCNC: 4.6 G/DL (ref 3.4–5)
ALBUMIN/GLOB SERPL: 1.6 {RATIO} (ref 0.8–1.7)
ALP SERPL-CCNC: 55 U/L (ref 45–117)
ALT SERPL-CCNC: 31 U/L (ref 13–56)
ANION GAP SERPL CALC-SCNC: 8 MMOL/L (ref 3–18)
APPEARANCE UR: CLEAR
AST SERPL-CCNC: 17 U/L (ref 15–37)
BACTERIA URNS QL MICRO: NEGATIVE /HPF
BASOPHILS # BLD: 0.1 K/UL (ref 0–0.1)
BASOPHILS NFR BLD: 1 % (ref 0–2)
BILIRUB SERPL-MCNC: 0.3 MG/DL (ref 0.2–1)
BILIRUB UR QL: NEGATIVE
BUN SERPL-MCNC: 14 MG/DL (ref 7–18)
BUN/CREAT SERPL: 16 (ref 12–20)
CALCIUM SERPL-MCNC: 8.5 MG/DL (ref 8.5–10.1)
CHLORIDE SERPL-SCNC: 106 MMOL/L (ref 100–108)
CO2 SERPL-SCNC: 23 MMOL/L (ref 21–32)
COLOR UR: YELLOW
CREAT SERPL-MCNC: 0.85 MG/DL (ref 0.6–1.3)
DIFFERENTIAL METHOD BLD: ABNORMAL
EOSINOPHIL # BLD: 0.2 K/UL (ref 0–0.4)
EOSINOPHIL NFR BLD: 2 % (ref 0–5)
EPITH CASTS URNS QL MICRO: NORMAL /LPF (ref 0–5)
ERYTHROCYTE [DISTWIDTH] IN BLOOD BY AUTOMATED COUNT: 13.3 % (ref 11.6–14.5)
GLOBULIN SER CALC-MCNC: 2.9 G/DL (ref 2–4)
GLUCOSE SERPL-MCNC: 88 MG/DL (ref 74–99)
GLUCOSE UR STRIP.AUTO-MCNC: NEGATIVE MG/DL
HCG UR QL: NEGATIVE
HCT VFR BLD AUTO: 42.7 % (ref 35–45)
HGB BLD-MCNC: 13.9 G/DL (ref 12–16)
HGB UR QL STRIP: NEGATIVE
KETONES UR QL STRIP.AUTO: ABNORMAL MG/DL
LEUKOCYTE ESTERASE UR QL STRIP.AUTO: ABNORMAL
LIPASE SERPL-CCNC: 186 U/L (ref 73–393)
LYMPHOCYTES # BLD: 1.3 K/UL (ref 0.9–3.6)
LYMPHOCYTES NFR BLD: 14 % (ref 21–52)
MCH RBC QN AUTO: 30.5 PG (ref 24–34)
MCHC RBC AUTO-ENTMCNC: 32.6 G/DL (ref 31–37)
MCV RBC AUTO: 93.8 FL (ref 74–97)
MONOCYTES # BLD: 0.7 K/UL (ref 0.05–1.2)
MONOCYTES NFR BLD: 7 % (ref 3–10)
NEUTS SEG # BLD: 7.2 K/UL (ref 1.8–8)
NEUTS SEG NFR BLD: 76 % (ref 40–73)
NITRITE UR QL STRIP.AUTO: NEGATIVE
PH UR STRIP: 5 [PH] (ref 5–8)
PLATELET # BLD AUTO: 192 K/UL (ref 135–420)
PMV BLD AUTO: 13.3 FL (ref 9.2–11.8)
POTASSIUM SERPL-SCNC: 4.4 MMOL/L (ref 3.5–5.5)
PROT SERPL-MCNC: 7.5 G/DL (ref 6.4–8.2)
PROT UR STRIP-MCNC: NEGATIVE MG/DL
RBC # BLD AUTO: 4.55 M/UL (ref 4.2–5.3)
RBC #/AREA URNS HPF: 0 /HPF (ref 0–5)
SODIUM SERPL-SCNC: 137 MMOL/L (ref 136–145)
SP GR UR REFRACTOMETRY: 1.03 (ref 1–1.03)
UROBILINOGEN UR QL STRIP.AUTO: 0.2 EU/DL (ref 0.2–1)
WBC # BLD AUTO: 9.5 K/UL (ref 4.6–13.2)
WBC URNS QL MICRO: NORMAL /HPF (ref 0–4)

## 2019-07-17 PROCEDURE — 99284 EMERGENCY DEPT VISIT MOD MDM: CPT

## 2019-07-17 PROCEDURE — 80053 COMPREHEN METABOLIC PANEL: CPT

## 2019-07-17 PROCEDURE — 76856 US EXAM PELVIC COMPLETE: CPT

## 2019-07-17 PROCEDURE — 81025 URINE PREGNANCY TEST: CPT

## 2019-07-17 PROCEDURE — 96374 THER/PROPH/DIAG INJ IV PUSH: CPT

## 2019-07-17 PROCEDURE — 74011250636 HC RX REV CODE- 250/636: Performed by: EMERGENCY MEDICINE

## 2019-07-17 PROCEDURE — 81001 URINALYSIS AUTO W/SCOPE: CPT

## 2019-07-17 PROCEDURE — 71046 X-RAY EXAM CHEST 2 VIEWS: CPT

## 2019-07-17 PROCEDURE — 83690 ASSAY OF LIPASE: CPT

## 2019-07-17 PROCEDURE — 85025 COMPLETE CBC W/AUTO DIFF WBC: CPT

## 2019-07-17 RX ORDER — ONDANSETRON 2 MG/ML
4 INJECTION INTRAMUSCULAR; INTRAVENOUS
Status: COMPLETED | OUTPATIENT
Start: 2019-07-17 | End: 2019-07-17

## 2019-07-17 RX ADMIN — ONDANSETRON 4 MG: 2 INJECTION INTRAMUSCULAR; INTRAVENOUS at 21:49

## 2019-07-17 NOTE — ED NOTES
PT report received [facility] [address] [place], [place] [address].

## 2019-07-17 NOTE — ED PROVIDER NOTES
Jani Spear is a 30 y.o. Female who is similar to me from my last visit with complaints of now increased swelling of lymph nodes on the left side of her body along with some increased left-sided abdominal pain for last few days. Patient noticed increased lymph node swelling along her left neck left groin area. There is been also continued weight loss although she has been eating regularly and more than normal.  Patient also has night sweats as well as occasional cough and chest pain. She has no known fever. But she has hot and cold flashes as well. Patient has been compliant with her other medications. She is also pending a rheumatologic appointment as well. She is concerned about the lymph node swelling. The history is provided by the patient and medical records. Past Medical History:   Diagnosis Date    Gastric ulcer     Gluten intolerance     Hashimoto's disease     Hyperthyroidism     Psychiatric disorder        Past Surgical History:   Procedure Laterality Date    HX GYN           No family history on file.     Social History     Socioeconomic History    Marital status:      Spouse name: Not on file    Number of children: Not on file    Years of education: Not on file    Highest education level: Not on file   Occupational History    Not on file   Social Needs    Financial resource strain: Not on file    Food insecurity:     Worry: Not on file     Inability: Not on file    Transportation needs:     Medical: Not on file     Non-medical: Not on file   Tobacco Use    Smoking status: Never Smoker    Smokeless tobacco: Never Used   Substance and Sexual Activity    Alcohol use: Yes     Comment: rarely    Drug use: No    Sexual activity: Not on file   Lifestyle    Physical activity:     Days per week: Not on file     Minutes per session: Not on file    Stress: Not on file   Relationships    Social connections:     Talks on phone: Not on file     Gets together: Not on file Attends Scientology service: Not on file     Active member of club or organization: Not on file     Attends meetings of clubs or organizations: Not on file     Relationship status: Not on file    Intimate partner violence:     Fear of current or ex partner: Not on file     Emotionally abused: Not on file     Physically abused: Not on file     Forced sexual activity: Not on file   Other Topics Concern    Not on file   Social History Narrative    Not on file         ALLERGIES: Percocet [oxycodone-acetaminophen]    Review of Systems   Constitutional: Positive for appetite change, diaphoresis and fatigue. HENT: Negative for sore throat and trouble swallowing. Eyes: Negative for visual disturbance. Respiratory: Positive for cough. Cardiovascular: Positive for palpitations. Gastrointestinal: Positive for abdominal pain. Negative for blood in stool and constipation. Endocrine: Positive for heat intolerance. Genitourinary: Positive for menstrual problem (no menses). Musculoskeletal: Positive for arthralgias. Negative for gait problem. Allergic/Immunologic: Negative for immunocompromised state. Neurological: Negative for syncope. Psychiatric/Behavioral: Positive for sleep disturbance (Sleeping more than normal). Vitals:    07/17/19 2103 07/17/19 2104 07/17/19 2105 07/17/19 2106   BP:       Pulse:       Resp:       Temp:       SpO2: 99% 100% 99% 96%   Weight:       Height:                Physical Exam   Constitutional: She is oriented to person, place, and time. She appears well-developed and well-nourished. Non-toxic appearance. She does not have a sickly appearance. She does not appear ill. No distress. HENT:   Head: Normocephalic and atraumatic. Right Ear: External ear normal.   Left Ear: External ear normal.   Nose: Nose normal.   Mouth/Throat: Uvula is midline, oropharynx is clear and moist and mucous membranes are normal.   Eyes: Conjunctivae are normal. No scleral icterus. Neck: Neck supple. Cardiovascular: Normal rate, regular rhythm, normal heart sounds and intact distal pulses. Pulmonary/Chest: Effort normal and breath sounds normal.   Abdominal: Soft. Normal appearance. There is no hepatosplenomegaly. There is tenderness. There is no rebound and no guarding. Musculoskeletal: She exhibits no edema. Lymphadenopathy:        Head (left side): Occipital adenopathy present. She has cervical adenopathy. Left cervical: Posterior cervical adenopathy present. She has axillary adenopathy. Left: Inguinal adenopathy present. Neurological: She is alert and oriented to person, place, and time. Gait normal.   Skin: Skin is warm and dry. She is not diaphoretic. Psychiatric: Her behavior is normal.   Nursing note and vitals reviewed. MDM       Procedures    Vitals:  Patient Vitals for the past 12 hrs:   Temp Pulse Resp BP SpO2   07/17/19 2106     96 %   07/17/19 2105     99 %   07/17/19 2104     100 %   07/17/19 2103     99 %   07/17/19 2102     100 %   07/17/19 2101     100 %   07/17/19 2100    119/79 100 %   07/17/19 1635 97.9 °F (36.6 °C) (!) 102 16 (!) 142/99 100 %         Medications ordered:   Medications - No data to display      Lab findings:  Recent Results (from the past 12 hour(s))   CBC WITH AUTOMATED DIFF    Collection Time: 07/17/19  6:15 PM   Result Value Ref Range    WBC 9.5 4.6 - 13.2 K/uL    RBC 4.55 4.20 - 5.30 M/uL    HGB 13.9 12.0 - 16.0 g/dL    HCT 42.7 35.0 - 45.0 %    MCV 93.8 74.0 - 97.0 FL    MCH 30.5 24.0 - 34.0 PG    MCHC 32.6 31.0 - 37.0 g/dL    RDW 13.3 11.6 - 14.5 %    PLATELET 742 987 - 613 K/uL    MPV 13.3 (H) 9.2 - 11.8 FL    NEUTROPHILS 76 (H) 40 - 73 %    LYMPHOCYTES 14 (L) 21 - 52 %    MONOCYTES 7 3 - 10 %    EOSINOPHILS 2 0 - 5 %    BASOPHILS 1 0 - 2 %    ABS. NEUTROPHILS 7.2 1.8 - 8.0 K/UL    ABS. LYMPHOCYTES 1.3 0.9 - 3.6 K/UL    ABS. MONOCYTES 0.7 0.05 - 1.2 K/UL    ABS.  EOSINOPHILS 0.2 0.0 - 0.4 K/UL    ABS. BASOPHILS 0.1 0.0 - 0.1 K/UL    DF AUTOMATED     METABOLIC PANEL, COMPREHENSIVE    Collection Time: 07/17/19  6:15 PM   Result Value Ref Range    Sodium 137 136 - 145 mmol/L    Potassium 4.4 3.5 - 5.5 mmol/L    Chloride 106 100 - 108 mmol/L    CO2 23 21 - 32 mmol/L    Anion gap 8 3.0 - 18 mmol/L    Glucose 88 74 - 99 mg/dL    BUN 14 7.0 - 18 MG/DL    Creatinine 0.85 0.6 - 1.3 MG/DL    BUN/Creatinine ratio 16 12 - 20      GFR est AA >60 >60 ml/min/1.73m2    GFR est non-AA >60 >60 ml/min/1.73m2    Calcium 8.5 8.5 - 10.1 MG/DL    Bilirubin, total 0.3 0.2 - 1.0 MG/DL    ALT (SGPT) 31 13 - 56 U/L    AST (SGOT) 17 15 - 37 U/L    Alk. phosphatase 55 45 - 117 U/L    Protein, total 7.5 6.4 - 8.2 g/dL    Albumin 4.6 3.4 - 5.0 g/dL    Globulin 2.9 2.0 - 4.0 g/dL    A-G Ratio 1.6 0.8 - 1.7     HCG URINE, QL    Collection Time: 07/17/19  6:15 PM   Result Value Ref Range    HCG urine, QL NEGATIVE  NEG     URINALYSIS W/ RFLX MICROSCOPIC    Collection Time: 07/17/19  6:15 PM   Result Value Ref Range    Color YELLOW      Appearance CLEAR      Specific gravity 1.027 1.005 - 1.030      pH (UA) 5.0 5.0 - 8.0      Protein NEGATIVE  NEG mg/dL    Glucose NEGATIVE  NEG mg/dL    Ketone TRACE (A) NEG mg/dL    Bilirubin NEGATIVE  NEG      Blood NEGATIVE  NEG      Urobilinogen 0.2 0.2 - 1.0 EU/dL    Nitrites NEGATIVE  NEG      Leukocyte Esterase TRACE (A) NEG     LIPASE    Collection Time: 07/17/19  6:15 PM   Result Value Ref Range    Lipase 186 73 - 393 U/L   URINE MICROSCOPIC ONLY    Collection Time: 07/17/19  6:15 PM   Result Value Ref Range    WBC 2 to 3 0 - 4 /hpf    RBC 0 0 - 5 /hpf    Epithelial cells 1+ 0 - 5 /lpf    Bacteria NEGATIVE  NEG /hpf       EKG interpretation by ED Physician:      X-Ray, CT or other radiology findings or impressions:  XR CHEST PA LAT   Final Result   IMPRESSION:      1.  Stable chest series  since prior exam  5-2017. Hyperinflation.   No   radiographic evidence of acute cardiopulmonary disease. US PELV NON OB W TV W DOPPLER    (Results Pending)   Ultrasound with enlarged normal appearing lymph nodes in left inguinal and perineal area. There is a well-positioned IUD at uterine fundus. There is normal appearance of bilateral ovaries with demonstrated normal flow    Progress notes, Consult notes or additional Procedure notes: There is no clear-cut infection to require antibiotics or other work-up at this time. Do not feel patient requires repeat CT imaging. Patient will require further outpatient work-up. I have discussed with patient and/or family/sig other the results, interpretation of any imaging if performed, suspected diagnosis and treatment plan to include instructions regarding the diagnoses listed to which understanding was expressed with all questions answered      Reevaluation of patient:   stable    Disposition:  Diagnosis:   1. Abdominal pain, chronic, left lower quadrant    2. Lymphadenitis        Disposition: home    Follow-up Information     Follow up With Specialties Details Why Contact Info    India Crawford NP Nurse Practitioner Schedule an appointment as soon as possible for a visit  [address] [facility]  [phone]      Follow-up with your GYN and rheumatology appointment to schedule                Patient's Medications   Start Taking    No medications on file   Continue Taking    LEVOTHYROXINE (SYNTHROID) 25 MCG TABLET    Take  by mouth Daily (before breakfast). LIOTHYRONINE (CYTOMEL) 5 MCG TABLET    Take 5 mcg by mouth daily.    These Medications have changed    No medications on file   Stop Taking    No medications on file

## 2019-07-17 NOTE — ED TRIAGE NOTES
Multiple complaints to see rhumatologist in September. Swollen glands. Abdominal swelling. Back pain. Shakes.

## 2019-07-18 NOTE — ED NOTES
Patient discharged. Condition stable. Patient discharged home. Patient education was completed: Yes  Education taught to : Patient  Teaching method used was discussion and handout. Understanding of teaching was good.   0 RX

## 2019-07-18 NOTE — DISCHARGE INSTRUCTIONS
Patient Education        Abdominal Pain: Care Instructions  Your Care Instructions    Abdominal pain has many possible causes. Some aren't serious and get better on their own in a few days. Others need more testing and treatment. If your pain continues or gets worse, you need to be rechecked and may need more tests to find out what is wrong. You may need surgery to correct the problem. Don't ignore new symptoms, such as fever, nausea and vomiting, urination problems, pain that gets worse, and dizziness. These may be signs of a more serious problem. Your doctor may have recommended a follow-up visit in the next 8 to 12 hours. If you are not getting better, you may need more tests or treatment. The doctor has checked you carefully, but problems can develop later. If you notice any problems or new symptoms, get medical treatment right away. Follow-up care is a key part of your treatment and safety. Be sure to make and go to all appointments, and call your doctor if you are having problems. It's also a good idea to know your test results and keep a list of the medicines you take. How can you care for yourself at home? · Rest until you feel better. · To prevent dehydration, drink plenty of fluids, enough so that your urine is light yellow or clear like water. Choose water and other caffeine-free clear liquids until you feel better. If you have kidney, heart, or liver disease and have to limit fluids, talk with your doctor before you increase the amount of fluids you drink. · If your stomach is upset, eat mild foods, such as rice, dry toast or crackers, bananas, and applesauce. Try eating several small meals instead of two or three large ones. · Wait until 48 hours after all symptoms have gone away before you have spicy foods, alcohol, and drinks that contain caffeine. · Do not eat foods that are high in fat. · Avoid anti-inflammatory medicines such as aspirin, ibuprofen (Advil, Motrin), and naproxen (Aleve). These can cause stomach upset. Talk to your doctor if you take daily aspirin for another health problem. When should you call for help? Call 911 anytime you think you may need emergency care. For example, call if:    · You passed out (lost consciousness).     · You pass maroon or very bloody stools.     · You vomit blood or what looks like coffee grounds.     · You have new, severe belly pain.    Call your doctor now or seek immediate medical care if:    · Your pain gets worse, especially if it becomes focused in one area of your belly.     · You have a new or higher fever.     · Your stools are black and look like tar, or they have streaks of blood.     · You have unexpected vaginal bleeding.     · You have symptoms of a urinary tract infection. These may include:  ? Pain when you urinate. ? Urinating more often than usual.  ? Blood in your urine.     · You are dizzy or lightheaded, or you feel like you may faint.    Watch closely for changes in your health, and be sure to contact your doctor if:    · You are not getting better after 1 day (24 hours). Where can you learn more? Go to http://georgesPECO Palletomero.info/. Enter K316 in the search box to learn more about \"Abdominal Pain: Care Instructions. \"  Current as of: September 23, 2018  Content Version: 11.9  © 4419-8536 [employer]. Care instructions adapted under license by [employer] (which disclaims liability or warranty for this information). If you have questions about a medical condition or this instruction, always ask your healthcare professional. [place] [address] any warranty or liability for your use of this information. Patient Education        Swollen Lymph Nodes: Care Instructions  Your Care Instructions    Lymph nodes are small, bean-shaped glands throughout the body. They help your body fight germs and infections.   Lymph nodes often swell when there is a problem such as an injury, infection, or tumor. · The nodes in your neck, under your chin, or behind your ears may swell when you have a cold or sore throat. · An injury or infection in a leg or foot can make the nodes in your groin swell. · Sometimes medicine can make lymph nodes swell, but this is rare. Treatment depends on what caused your nodes to swell. Usually the nodes return to normal size without a problem. Follow-up care is a key part of your treatment and safety. Be sure to make and go to all appointments, and call your doctor if you are having problems. It's also a good idea to know your test results and keep a list of the medicines you take. How can you care for yourself at home? · Take your medicines exactly as prescribed. Call your doctor if you think you are having a problem with your medicine. · Avoid irritation. ? Do not squeeze or pick at the lump. ? Do not stick a needle in it. · Prevent infection. Do not squeeze, drain, or puncture a painful lump. Doing this can irritate or inflame the lump, push any existing infection deeper into the skin, or cause severe bleeding. · Get extra rest. Slow down just a little from your usual routine. · Drink plenty of fluids, enough so that your urine is light yellow or clear like water. If you have kidney, heart, or liver disease and have to limit fluids, talk with your doctor before you increase the amount of fluids you drink. · Take an over-the-counter pain medicine, such as acetaminophen (Tylenol), ibuprofen (Advil, Motrin), or naproxen (Aleve). Read and follow all instructions on the label. · Do not take two or more pain medicines at the same time unless the doctor told you to. Many pain medicines have acetaminophen, which is Tylenol. Too much acetaminophen (Tylenol) can be harmful. When should you call for help?   Call your doctor now or seek immediate medical care if:    · You have worse symptoms of infection, such as:  ? Increased pain, swelling, warmth, or redness. ? Red streaks leading from the area. ? Pus draining from the area. ? A fever.    Watch closely for changes in your health, and be sure to contact your doctor if:    · Your lymph nodes do not get smaller or do not return to normal.     · You do not get better as expected. Where can you learn more? Go to http://georges-omero.info/. Enter R127 in the search box to learn more about \"Swollen Lymph Nodes: Care Instructions. \"  Current as of: July 30, 2018  Content Version: 11.9  © 6840-3156 [employer]. Care instructions adapted under license by [employer] (which disclaims liability or warranty for this information). If you have questions about a medical condition or this instruction, always ask your healthcare professional. [address] any warranty or liability for your use of this information.

## 2019-07-18 NOTE — ED NOTES
PT A&OX4, patent airway, non-labored breathing, PERRLA, skin warm/dry, sinus on monitor, playing with Cell phone, NAD

## 2019-07-30 ENCOUNTER — HOSPITAL ENCOUNTER (EMERGENCY)
Age: 34
Discharge: HOME | End: 2019-07-30
Attending: EMERGENCY MEDICINE
Payer: OTHER GOVERNMENT

## 2019-07-30 VITALS
OXYGEN SATURATION: 99 % | SYSTOLIC BLOOD PRESSURE: 123 MMHG | BODY MASS INDEX: 14.4 KG/M2 | RESPIRATION RATE: 17 BRPM | TEMPERATURE: 97.8 F | WEIGHT: 95 LBS | DIASTOLIC BLOOD PRESSURE: 86 MMHG | HEIGHT: 68 IN | HEART RATE: 70 BPM

## 2019-07-30 DIAGNOSIS — R53.1 WEAKNESS: ICD-10-CM

## 2019-07-30 DIAGNOSIS — R11.0 NAUSEA WITHOUT VOMITING: ICD-10-CM

## 2019-07-30 DIAGNOSIS — R59.1 LYMPHADENOPATHY: Primary | ICD-10-CM

## 2019-07-30 LAB
ALBUMIN SERPL-MCNC: 4.3 G/DL (ref 3.4–5)
ALBUMIN/GLOB SERPL: 1.3 {RATIO} (ref 0.8–1.7)
ALP SERPL-CCNC: 58 U/L (ref 45–117)
ALT SERPL-CCNC: 101 U/L (ref 13–56)
ANION GAP SERPL CALC-SCNC: 6 MMOL/L (ref 3–18)
AST SERPL-CCNC: 63 U/L (ref 10–38)
BASOPHILS # BLD: 0.1 K/UL (ref 0–0.1)
BASOPHILS NFR BLD: 1 % (ref 0–2)
BILIRUB SERPL-MCNC: 0.4 MG/DL (ref 0.2–1)
BUN SERPL-MCNC: 13 MG/DL (ref 7–18)
BUN/CREAT SERPL: 15 (ref 12–20)
CALCIUM SERPL-MCNC: 8.5 MG/DL (ref 8.5–10.1)
CHLORIDE SERPL-SCNC: 108 MMOL/L (ref 100–111)
CO2 SERPL-SCNC: 26 MMOL/L (ref 21–32)
CREAT SERPL-MCNC: 0.86 MG/DL (ref 0.6–1.3)
DIFFERENTIAL METHOD BLD: ABNORMAL
EOSINOPHIL # BLD: 0.3 K/UL (ref 0–0.4)
EOSINOPHIL NFR BLD: 4 % (ref 0–5)
ERYTHROCYTE [DISTWIDTH] IN BLOOD BY AUTOMATED COUNT: 13.1 % (ref 11.6–14.5)
GLOBULIN SER CALC-MCNC: 3.2 G/DL (ref 2–4)
GLUCOSE SERPL-MCNC: 87 MG/DL (ref 74–99)
HCG SERPL QL: NEGATIVE
HCT VFR BLD AUTO: 46.1 % (ref 35–45)
HGB BLD-MCNC: 15 G/DL (ref 12–16)
LIPASE SERPL-CCNC: 83 U/L (ref 73–393)
LYMPHOCYTES # BLD: 1.2 K/UL (ref 0.9–3.6)
LYMPHOCYTES NFR BLD: 14 % (ref 21–52)
MAGNESIUM SERPL-MCNC: 2.2 MG/DL (ref 1.6–2.6)
MCH RBC QN AUTO: 30.7 PG (ref 24–34)
MCHC RBC AUTO-ENTMCNC: 32.5 G/DL (ref 31–37)
MCV RBC AUTO: 94.5 FL (ref 74–97)
MONOCYTES # BLD: 0.7 K/UL (ref 0.05–1.2)
MONOCYTES NFR BLD: 9 % (ref 3–10)
NEUTS SEG # BLD: 6 K/UL (ref 1.8–8)
NEUTS SEG NFR BLD: 72 % (ref 40–73)
PLATELET # BLD AUTO: 213 K/UL (ref 135–420)
PMV BLD AUTO: 13.1 FL (ref 9.2–11.8)
POTASSIUM SERPL-SCNC: 5.1 MMOL/L (ref 3.5–5.5)
PROT SERPL-MCNC: 7.5 G/DL (ref 6.4–8.2)
RBC # BLD AUTO: 4.88 M/UL (ref 4.2–5.3)
SODIUM SERPL-SCNC: 140 MMOL/L (ref 136–145)
WBC # BLD AUTO: 8.3 K/UL (ref 4.6–13.2)

## 2019-07-30 PROCEDURE — 74011250636 HC RX REV CODE- 250/636: Performed by: EMERGENCY MEDICINE

## 2019-07-30 PROCEDURE — 96374 THER/PROPH/DIAG INJ IV PUSH: CPT

## 2019-07-30 PROCEDURE — 83690 ASSAY OF LIPASE: CPT

## 2019-07-30 PROCEDURE — 74011250637 HC RX REV CODE- 250/637: Performed by: EMERGENCY MEDICINE

## 2019-07-30 PROCEDURE — 85025 COMPLETE CBC W/AUTO DIFF WBC: CPT

## 2019-07-30 PROCEDURE — 83735 ASSAY OF MAGNESIUM: CPT

## 2019-07-30 PROCEDURE — 80053 COMPREHEN METABOLIC PANEL: CPT

## 2019-07-30 PROCEDURE — 96361 HYDRATE IV INFUSION ADD-ON: CPT

## 2019-07-30 PROCEDURE — 84703 CHORIONIC GONADOTROPIN ASSAY: CPT

## 2019-07-30 PROCEDURE — 93005 ELECTROCARDIOGRAM TRACING: CPT

## 2019-07-30 PROCEDURE — 99284 EMERGENCY DEPT VISIT MOD MDM: CPT

## 2019-07-30 RX ORDER — ONDANSETRON 2 MG/ML
4 INJECTION INTRAMUSCULAR; INTRAVENOUS
Status: COMPLETED | OUTPATIENT
Start: 2019-07-30 | End: 2019-07-30

## 2019-07-30 RX ORDER — ACETAMINOPHEN 500 MG
1000 TABLET ORAL
Status: COMPLETED | OUTPATIENT
Start: 2019-07-30 | End: 2019-07-30

## 2019-07-30 RX ORDER — ONDANSETRON 4 MG/1
4 TABLET, FILM COATED ORAL
Qty: 12 TAB | Refills: 0 | Status: SHIPPED | OUTPATIENT
Start: 2019-07-30

## 2019-07-30 RX ADMIN — ONDANSETRON 4 MG: 2 INJECTION INTRAMUSCULAR; INTRAVENOUS at 12:13

## 2019-07-30 RX ADMIN — ACETAMINOPHEN 1000 MG: 500 TABLET, FILM COATED ORAL at 12:13

## 2019-07-30 RX ADMIN — SODIUM CHLORIDE 1000 ML: 900 INJECTION, SOLUTION INTRAVENOUS at 12:13

## 2019-07-30 NOTE — ED NOTES
I have reviewed discharge instructions with the patient. The patient verbalized understanding. Patient armband removed and shredded.  Patient will redress self and ambulate independently out of care area

## 2019-07-30 NOTE — DISCHARGE INSTRUCTIONS
Patient Education        Swollen Lymph Nodes: Care Instructions  Your Care Instructions    Lymph nodes are small, bean-shaped glands throughout the body. They help your body fight germs and infections. Lymph nodes often swell when there is a problem such as an injury, infection, or tumor. · The nodes in your neck, under your chin, or behind your ears may swell when you have a cold or sore throat. · An injury or infection in a leg or foot can make the nodes in your groin swell. · Sometimes medicine can make lymph nodes swell, but this is rare. Treatment depends on what caused your nodes to swell. Usually the nodes return to normal size without a problem. Follow-up care is a key part of your treatment and safety. Be sure to make and go to all appointments, and call your doctor if you are having problems. It's also a good idea to know your test results and keep a list of the medicines you take. How can you care for yourself at home? · Take your medicines exactly as prescribed. Call your doctor if you think you are having a problem with your medicine. · Avoid irritation. ? Do not squeeze or pick at the lump. ? Do not stick a needle in it. · Prevent infection. Do not squeeze, drain, or puncture a painful lump. Doing this can irritate or inflame the lump, push any existing infection deeper into the skin, or cause severe bleeding. · Get extra rest. Slow down just a little from your usual routine. · Drink plenty of fluids, enough so that your urine is light yellow or clear like water. If you have kidney, heart, or liver disease and have to limit fluids, talk with your doctor before you increase the amount of fluids you drink. · Take an over-the-counter pain medicine, such as acetaminophen (Tylenol), ibuprofen (Advil, Motrin), or naproxen (Aleve). Read and follow all instructions on the label. · Do not take two or more pain medicines at the same time unless the doctor told you to.  Many pain medicines have acetaminophen, which is Tylenol. Too much acetaminophen (Tylenol) can be harmful. When should you call for help? Call your doctor now or seek immediate medical care if:    · You have worse symptoms of infection, such as:  ? Increased pain, swelling, warmth, or redness. ? Red streaks leading from the area. ? Pus draining from the area. ? A fever.    Watch closely for changes in your health, and be sure to contact your doctor if:    · Your lymph nodes do not get smaller or do not return to normal.     · You do not get better as expected. Where can you learn more? Go to http://georges-omero.info/. Enter Y646 in the search box to learn more about \"Swollen Lymph Nodes: Care Instructions. \"  Current as of: July 30, 2018  Content Version: 12.1  © 0508-3343 [employer]. Care instructions adapted under license by [employer] (which disclaims liability or warranty for this information). If you have questions about a medical condition or this instruction, always ask your healthcare professional. [place] [address] any warranty or liability for your use of this information.

## 2019-07-30 NOTE — ED PROVIDER NOTES
EMERGENCY DEPARTMENT HISTORY AND PHYSICAL EXAM    11:41 AM      Date: 7/30/2019  Patient Name: Efrain Arce    History of Presenting Illness     Chief Complaint   Patient presents with    Other         HISTORY: Efrain Arce is a 30 y.o. female with medical history as listed below who presents with complaints. Patient reports having lymphadenopathy in the abdomen and in the neck region of which the etiology is unclear. This is been present for at least 3 to 4 weeks. She reports the lymph nodes in her neck cause her significant pressure and she was unable to sleep last night. She reports ongoing issues with eating and having dizziness. She is awaiting appointments with her rheumatologist and oncologist.  Her primary care doctor put her on Augmentin and loratadine recently called her primary care doctor regarding her symptoms this morning and was told to come to the emergency department. She has not yet had a biopsy for her lymphadenopathy. She currently denies a fever, chest pain, difficulty breathing, vomiting, dysuria. PCP: Soni Chen NP    Current Facility-Administered Medications   Medication Dose Route Frequency Provider Last Rate Last Dose    sodium chloride 0.9 % bolus infusion 1,000 mL  1,000 mL IntraVENous ONCE Laci Núñez MD        ondansetron [facility]) injection 4 mg  4 mg IntraVENous NOW Laci Núñez MD        acetaminophen (TYLENOL) tablet 1,000 mg  1,000 mg Oral NOW Laci Núñez MD         Current Outpatient Medications   Medication Sig Dispense Refill    liothyronine (CYTOMEL) 5 mcg tablet Take 5 mcg by mouth daily.  levothyroxine (SYNTHROID) 25 mcg tablet Take  by mouth Daily (before breakfast).          Past History     Past Medical History:  Past Medical History:   Diagnosis Date    Gastric ulcer     Gluten intolerance     Hashimoto's disease     Hyperthyroidism     Psychiatric disorder        Past Surgical History:  Past Surgical History:   Procedure Laterality Date  HX GYN         Family History:  History reviewed. No pertinent family history. Social History:  Social History     Tobacco Use    Smoking status: Never Smoker    Smokeless tobacco: Never Used   Substance Use Topics    Alcohol use: Yes     Comment: rarely    Drug use: No       Allergies: Allergies   Allergen Reactions    Percocet [Oxycodone-Acetaminophen] Other (comments)     seizure         Review of Systems       Review of Systems   Constitutional: Positive for appetite change and unexpected weight change. Negative for chills. HENT: Negative for congestion and sore throat. Respiratory: Negative for cough and shortness of breath. Cardiovascular: Negative for chest pain. Gastrointestinal: Positive for abdominal pain (chronic) and nausea. Negative for diarrhea and vomiting. Genitourinary: Negative for dysuria. Musculoskeletal: Positive for neck pain ( at site of lymph nodes). Negative for back pain. Skin: Negative for rash. Neurological: Positive for weakness and light-headedness. Negative for headaches. All other systems reviewed and are negative. Physical Exam     Visit Vitals  BP (!) 144/91 (BP 1 Location: Right arm, BP Patient Position: At rest)   Pulse (!) 101   Temp 97.8 °F (36.6 °C)   Resp 17   Ht 5' 8\" (1.727 m)   Wt 43.1 kg (95 lb)   LMP 05/17/2019   SpO2 100%   BMI 14.44 kg/m²       Physical Exam  General Exam: Patient is a well developed in no distress. Patient does not appear acutely ill or toxic. Very thin  Eye Exam: Lids and conjunctiva are normal  ENT Exam: The general head and facial exam is normal.  The neck is supple without meningeal signs. Left cervical lymphadenopathy, no oral swelling or masses  Pulmonary Exam: No respiratory distress. The respiratory rate is normal.  No stridor. The breath sounds are equal bilaterally. There are no wheezes, rales, or rhonchi noted.   Cardiac Exam: The cardiac rate and rhythm are normal.  No significant murmurs, rubs, or gallops. The peripheral pulses of the upper extremities are normal.  Abdominal Exam: Abdomen is soft and non-distended. No pulsatile masses. There is no local tenderness. There is no rebound or guarding noted. Skin and Soft Tissue: The skin is warm and dry, without significant abnormality. Good color. Musculoskeletal Exam: No peripheral edema. The musculoskeletal exam of the lower extremities is normal without significant local tenderness. Neurologic: Pt is alert and appropriate. Normal speech. Normal symmetric muscle strength and tone in all extremities. Normal gait. Psychiatric: Normal adult with appropriate demeanor and interpersonal interaction. Is oriented to person, place, and time. Diagnostic Study Results     Labs -  No results found for this or any previous visit (from the past 12 hour(s)). Radiologic Studies -   No orders to display         Medical Decision Making   I am the first provider for this patient. I reviewed the vital signs, available nursing notes, past medical history, past surgical history, family history and social history. Vital Signs-Reviewed the patient's vital signs. EKG: Interpreted by the EP. EKG performed at 1215. Interpretation rate 70, normal sinus rhythm, no STEMI, no ST depressions    Records Reviewed: Nursing Notes (Time of Review: 11:41 AM)    ED Course: Progress Notes, Reevaluation, and Consults:      Provider Notes (Medical Decision Making): Patient with multiple complaints which is been ongoing for several weeks. [facility] concern is related to enlarged lymph nodes to the head and neck which is been there for a few weeks and she is currently on Augmentin and loratadine. On exam, she does not appear distressed. She has reassuring vitals. Her neurologic exam is normal.  I do not think she needs a CT of her head or neck at this time. No signs of airway compromise. Labs are reviewed and without acute findings.   EKG is a normal sinus rhythm without signs of acute ischemia. She feels better with IV fluids and Zofran. She will follow up with her primary care doctor. She is already been referred to a rheumatologist oncologist through her primary care doctor. Do not think she needs any further testing at this time. Ambulatory at discharge. 12:50 PM: She is feeling better after medications here. Advised on need for follow-up with her primary care doctor. Do not think she needs any further testing as her symptoms appear to be chronic in nature. Reports difficulty getting oncology appointment through her primary care doctor. I will give her information for Dr. Jeff Lucero and instructed her that she could try make an appointment with his office. Diagnosis     Clinical Impression:   1. Lymphadenopathy    2. Weakness    3. Nausea without vomiting        Disposition: DC    Follow-up Information    None          Patient's Medications   Start Taking    No medications on file   Continue Taking    LEVOTHYROXINE (SYNTHROID) 25 MCG TABLET    Take  by mouth Daily (before breakfast). LIOTHYRONINE (CYTOMEL) 5 MCG TABLET    Take 5 mcg by mouth daily. These Medications have changed    No medications on file   Stop Taking    No medications on file     _______________________________    Please note that this dictation was completed with Snipi, the computer voice recognition software. Quite often unanticipated grammatical, syntax, homophones, and other interpretive errors are inadvertently transcribed by the computer software. Please disregard these errors. Please excuse any errors that have escaped final proofreading.

## 2019-07-30 NOTE — ED NOTES
Report from [employer]. Assume care of patient at this time. Patient resting in position of comfort on stretcher in NAD. Patient A&O x 4 c/o swollen lymph nodes and headache. Posterior cervical lymph nodes tender with palpation, no redness or swelling noted. Onset of pain x several months, states pain feels like it burns and usually takes motrin with little relief. Referred from PCP for oncologist but has been unable to make appointment. Also c.o left sided headache onset same as swollen lymph nodes plus nausea. Patient states is from headache. Denies blurred vision or visual changes. handles oral secretions, no SOB, able to speak in full sentences.

## 2019-07-31 LAB
ATRIAL RATE: 70 BPM
CALCULATED P AXIS, ECG09: 78 DEGREES
CALCULATED R AXIS, ECG10: 66 DEGREES
CALCULATED T AXIS, ECG11: 64 DEGREES
DIAGNOSIS, 93000: NORMAL
P-R INTERVAL, ECG05: 144 MS
Q-T INTERVAL, ECG07: 392 MS
QRS DURATION, ECG06: 72 MS
QTC CALCULATION (BEZET), ECG08: 423 MS
VENTRICULAR RATE, ECG03: 70 BPM

## 2019-08-21 ENCOUNTER — HOSPITAL ENCOUNTER (OUTPATIENT)
Dept: INFUSION THERAPY | Age: 34
Discharge: HOME | End: 2019-08-21
Payer: OTHER GOVERNMENT

## 2019-08-21 ENCOUNTER — OFFICE VISIT (OUTPATIENT)
Dept: ONCOLOGY | Age: 34
End: 2019-08-21

## 2019-08-21 VITALS
DIASTOLIC BLOOD PRESSURE: 86 MMHG | WEIGHT: 104.4 LBS | OXYGEN SATURATION: 97 % | HEART RATE: 88 BPM | SYSTOLIC BLOOD PRESSURE: 135 MMHG | TEMPERATURE: 99 F

## 2019-08-21 VITALS
OXYGEN SATURATION: 97 % | RESPIRATION RATE: 18 BRPM | HEART RATE: 88 BPM | SYSTOLIC BLOOD PRESSURE: 135 MMHG | WEIGHT: 104.4 LBS | DIASTOLIC BLOOD PRESSURE: 86 MMHG | TEMPERATURE: 99.4 F

## 2019-08-21 DIAGNOSIS — K25.9 GASTRIC ULCER, UNSPECIFIED CHRONICITY, UNSPECIFIED WHETHER GASTRIC ULCER HEMORRHAGE OR PERFORATION PRESENT: ICD-10-CM

## 2019-08-21 DIAGNOSIS — M35.9 AUTOIMMUNE DISEASE (HCC): ICD-10-CM

## 2019-08-21 DIAGNOSIS — R42 DIZZINESS: ICD-10-CM

## 2019-08-21 DIAGNOSIS — R59.1 LYMPHADENOPATHY: ICD-10-CM

## 2019-08-21 DIAGNOSIS — H93.19 TINNITUS, UNSPECIFIED LATERALITY: ICD-10-CM

## 2019-08-21 DIAGNOSIS — R59.1 LYMPHADENOPATHY: Primary | ICD-10-CM

## 2019-08-21 LAB
ALBUMIN SERPL-MCNC: 4.5 G/DL (ref 3.4–5)
ALBUMIN/GLOB SERPL: 1.5 {RATIO} (ref 0.8–1.7)
ALP SERPL-CCNC: 65 U/L (ref 45–117)
ALT SERPL-CCNC: 37 U/L (ref 13–56)
ANION GAP SERPL CALC-SCNC: 2 MMOL/L (ref 3–18)
AST SERPL-CCNC: 15 U/L (ref 10–38)
BASO+EOS+MONOS # BLD AUTO: 0.6 K/UL (ref 0–2.3)
BASO+EOS+MONOS NFR BLD AUTO: 10 % (ref 0.1–17)
BILIRUB SERPL-MCNC: 0.2 MG/DL (ref 0.2–1)
BUN SERPL-MCNC: 11 MG/DL (ref 7–18)
BUN/CREAT SERPL: 14 (ref 12–20)
CALCIUM SERPL-MCNC: 9 MG/DL (ref 8.5–10.1)
CHLORIDE SERPL-SCNC: 105 MMOL/L (ref 100–111)
CO2 SERPL-SCNC: 28 MMOL/L (ref 21–32)
CREAT SERPL-MCNC: 0.78 MG/DL (ref 0.6–1.3)
DIFFERENTIAL METHOD BLD: ABNORMAL
ERYTHROCYTE [DISTWIDTH] IN BLOOD BY AUTOMATED COUNT: 12.4 % (ref 11.5–14.5)
GLOBULIN SER CALC-MCNC: 3.1 G/DL (ref 2–4)
GLUCOSE SERPL-MCNC: 68 MG/DL (ref 74–99)
HCT VFR BLD AUTO: 45.2 % (ref 36–48)
HGB BLD-MCNC: 14.3 G/DL (ref 12–16)
LYMPHOCYTES # BLD: 1 K/UL (ref 1.1–5.9)
LYMPHOCYTES NFR BLD: 17 % (ref 14–44)
MCH RBC QN AUTO: 30.3 PG (ref 25–35)
MCHC RBC AUTO-ENTMCNC: 31.6 G/DL (ref 31–37)
MCV RBC AUTO: 95.8 FL (ref 78–102)
NEUTS SEG # BLD: 4.3 K/UL (ref 1.8–9.5)
NEUTS SEG NFR BLD: 73 % (ref 40–70)
PLATELET # BLD AUTO: 193 K/UL (ref 135–420)
POTASSIUM SERPL-SCNC: 4.6 MMOL/L (ref 3.5–5.5)
PROT SERPL-MCNC: 7.6 G/DL (ref 6.4–8.2)
RBC # BLD AUTO: 4.72 M/UL (ref 4.1–5.1)
SODIUM SERPL-SCNC: 135 MMOL/L (ref 136–145)
WBC # BLD AUTO: 5.9 K/UL (ref 4.5–13)

## 2019-08-21 PROCEDURE — 80053 COMPREHEN METABOLIC PANEL: CPT

## 2019-08-21 PROCEDURE — 36415 COLL VENOUS BLD VENIPUNCTURE: CPT

## 2019-08-21 PROCEDURE — 83540 ASSAY OF IRON: CPT

## 2019-08-21 PROCEDURE — 85049 AUTOMATED PLATELET COUNT: CPT

## 2019-08-21 PROCEDURE — 85025 COMPLETE CBC W/AUTO DIFF WBC: CPT

## 2019-08-21 PROCEDURE — 82607 VITAMIN B-12: CPT

## 2019-08-21 PROCEDURE — 86677 HELICOBACTER PYLORI ANTIBODY: CPT

## 2019-08-21 PROCEDURE — 82728 ASSAY OF FERRITIN: CPT

## 2019-08-21 RX ORDER — MECLIZINE HYDROCHLORIDE 25 MG/1
25 TABLET ORAL
Qty: 90 TAB | Refills: 1 | Status: SHIPPED | OUTPATIENT
Start: 2019-08-21 | End: 2019-09-04 | Stop reason: SDUPTHER

## 2019-08-21 RX ORDER — LIOTHYRONINE SODIUM 5 UG/1
TABLET ORAL
Refills: 3 | COMMUNITY
Start: 2019-06-28

## 2019-08-21 RX ORDER — BUDESONIDE 3 MG/1
CAPSULE, COATED PELLETS ORAL
Refills: 0 | COMMUNITY
Start: 2019-06-25

## 2019-08-21 RX ORDER — PAROXETINE HYDROCHLORIDE 20 MG/1
TABLET, FILM COATED ORAL
Refills: 0 | COMMUNITY
Start: 2019-07-16

## 2019-08-21 RX ORDER — LEVOTHYROXINE SODIUM 25 UG/1
TABLET ORAL
Refills: 3 | COMMUNITY
Start: 2019-08-18

## 2019-08-21 NOTE — PROGRESS NOTES
[facility] Progress Note    Date: 2019    Name: Enmanuel Rajan    MRN: [id number]         : 1985    Peripheral Lab Draw      Ms. Andrade to [facility], ambulatory at [phone] accompanied by family. Pt was assessed and education was provided. Ms. Brittany Louis vitals were reviewed and patient was observed for 5 minutes prior to treatment. Visit Vitals  /86   Pulse 88   Temp 99 °F (37.2 °C) (Oral)   Wt 47.4 kg (104 lb 6.4 oz)   SpO2 97%     Recent Results (from the past 12 hour(s))   CBC WITH 3 PART DIFF    Collection Time: 19  2:20 PM   Result Value Ref Range    WBC 5.9 4.5 - 13.0 K/uL    RBC 4.72 4.10 - 5.10 M/uL    HGB 14.3 12.0 - 16.0 g/dL    HCT 45.2 36 - 48 %    MCV 95.8 78 - 102 FL    MCH 30.3 25.0 - 35.0 PG    MCHC 31.6 31 - 37 g/dL    RDW 12.4 11.5 - 14.5 %    NEUTROPHILS 73 (H) 40 - 70 %    MIXED CELLS 10 0.1 - 17 %    LYMPHOCYTES 17 14 - 44 %    ABS. NEUTROPHILS 4.3 1.8 - 9.5 K/UL    ABS. MIXED CELLS 0.6 0.0 - 2.3 K/uL    ABS. LYMPHOCYTES 1.0 (L) 1.1 - 5.9 K/UL    DF AUTOMATED         Blood obtained peripherally from left arm x 1 attempt with butterfly needle and sent to lab for Cbc w/diff, Cmp, Iron Profile, Ferritin, Vitamin B12 & Folate and H Pylori abs, IGA and IGG per written orders. No bleeding or hematoma noted at site. Gauze and coban applied. Ms. Garett Matthew tolerated the phlebotomy, and had no complaints. Patient armband removed and shredded. Ms. Garett Matthew was discharged from [place] [address] in stable condition at 1425.      Moise Olea Phlebotomist PCT  2019  3:15 PM

## 2019-08-21 NOTE — PROGRESS NOTES
[facility]  [id number] [place] [address], [address]  [place], [facility]  Office Phone: [phone]  Fax: [phone]      Reason for visit:  No chief complaint on file. HPI:   Bela Bellamy is a 30 y.o.  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 Mrs. Víctor Tran went to ER on 7/17/19 with c/o left sided lymphadenopathies. RF, ALEXANDREA and HIV were tested I  [facility].  Anti-double stranded DNA was positive but was not greater or equal to 10 international unit/mL. Her value was 5 which was indeterminate. CBC in March 2019 showed WBC 4.9, H&H 13.7/42.7, MCV 94, platelet 358. CMP was normal.  Treponema pallidum antibodies were negative. HIV screening was also negative. Her paternal aunt had breast cancer. And paternal grandmother had pancreatic cancer. Patient was seen and examined today. She is awake alert oriented x3. On review of systems she also says she has drenching night sweats better with diet. She reports 4/10 pain in the neck and back as well as shoulder and head. Also reports poor appetite and occasional nausea. Reports left-sided lymph node swelling. Denies any focal neurologic deficit. No bleeding. Has lightheadedness daily with tinnitus. ECOG performance status 0. Independent with ADLs and IADLs. DX: Lymphadenopathies    Past medical history: As per HPI  Past surgical history: Had granulomatous tumor removed from the eye  Social history: Smokes marijuana, . Also smokes cigarettes about 1 pack/day  Family history: As per HPI  Allergies: No nondrug allergies    Review of Systems  The patient has no B symptoms. He has left-sided lymph node swelling.   Remaining of 12 point of view of system otherwise negative. Objective:  Physical Exam:  Visit Vitals  /86   Pulse 88   Temp 99.4 °F (37.4 °C) (Oral)   Resp 18   Wt 47.4 kg (104 lb 6.4 oz)   SpO2 97%         General:  Alert, cooperative, no distress, appears stated age. Head:  Normocephalic, without obvious abnormality, atraumatic. Eyes:  Conjunctivae/corneas clear. PERRL, EOMs intact. Throat: Lips, mucosa, and tongue normal.    Neck: Supple, symmetrical, trachea midline, no adenopathy, thyroid: no enlargement/tenderness/nodules. She has no clinically enlarged LNs that needs attention   Back:   Symmetric, no curvature. ROM normal. No CVA tenderness. Lungs:   Clear to auscultation bilaterally. Chest wall:  No tenderness or deformity. Heart:  Regular rate and rhythm, S1, S2 normal, no murmur, click, rub or gallop. Abdomen:   Soft, non-tender. Bowel sounds normal. No masses,  No organomegaly. Extremities: Extremities normal, atraumatic, no cyanosis or edema. Skin: Skin color, texture, turgor normal. No rashes or lesions. Lymph nodes: Cervical, supraclavicular, and axillary nodes normal.   Neurologic: CNII-XII intact. Diagnostic Imaging     No results found for this or any previous visit. Lab Results  No results found for: WBC, HGB, HGBP, HCT, PHCT, RBCH, PLT, MCV, HGBEXT, HCTEXT, PLTEXT    No results found for: NA, K, CL, CO2, AGAP, GLU, BUN, CREA, BUCR, GFRAA, GFRNA, CA, TBIL, GPT, SGOT, AP, TP, ALB, GLOB, AGRAT, ALT    Assessment/Plan:  30 y.o. female with  1. Lymphadenopathy  This is likely reactive from her autoimmune disorders. Patient was treated with Augmentin. I did not feel any clinically significant lymphadenopathies. 2. Autoimmune disease ([facility] [address]  She had Hashimoto's thyroiditis and is being followed by rheumatology and endocrinology. She also has gluten intolerance. Plan is:  *CBC with differential, CMP  *Iron profile, ferritin, B12 and folate  Replace as needed    3.  Lightheadedness: with tinnitus. Trial of Meclizine   Refer to neurology    4.  Tobacco cessation:Tobacco cessation counseling    Return in 2 weeks

## 2019-08-21 NOTE — PROGRESS NOTES
Tony Corona is a 30 y.o. female presenting today for a new patient appointment. Patient is ambulatory with no assistive devices and reports 4/10 pain in back/shoulders/neck/head, night sweats, poor appetite, and nausea. Chief Complaint   Patient presents with   [address] [place] [address] Patient     Lymphadenopathy       Visit Vitals  /86   Pulse 88   Temp 99.4 °F (37.4 °C) (Oral)   Resp 18   Wt 104 lb 6.4 oz (47.4 kg)   SpO2 97%       Current Outpatient Medications   Medication Sig    levothyroxine (SYNTHROID) 25 mcg tablet TAKE 1 TABLET BY MOUTH ONCE DAILY    liothyronine (CYTOMEL) 5 mcg tablet TAKE 1 2 (ONE HALF) TABLET BY MOUTH TWICE DAILY    PARoxetine (PAXIL) 20 mg tablet TAKE 1 TABLET BY MOUTH ONCE DAILY IN THE MORNING FOR 90 DAYS    meclizine (ANTIVERT) 25 mg tablet Take 1 Tab by mouth three (3) times daily as needed for Dizziness or Nausea for up to 30 days.  budesonide (ENTOCORT EC) 3 mg capsule TAKE THREE CAPSULES BY MOUTH DAILY FOR 4 WEEKS THEN TAKE TWO CAPSULES DAILY FOR 2 WEEKS THEN ONE CAPSULE DAILY FOR 2 WEEKS     No current facility-administered medications for this visit.         Medications no longer taking/discontinued: none

## 2019-08-22 LAB
FERRITIN SERPL-MCNC: 30 NG/ML (ref 8–388)
FOLATE SERPL-MCNC: 20 NG/ML (ref 3.1–17.5)
IRON SATN MFR SERPL: 26 %
IRON SERPL-MCNC: 88 UG/DL (ref 50–175)
TIBC SERPL-MCNC: 342 UG/DL (ref 250–450)
VIT B12 SERPL-MCNC: 423 PG/ML (ref 211–911)

## 2019-08-26 ENCOUNTER — TELEPHONE (OUTPATIENT)
Dept: ONCOLOGY | Age: 34
End: 2019-08-26

## 2019-08-26 LAB
H PYLORI IGA SER-ACNC: <9 UNITS (ref 0–8.9)
H PYLORI IGG SER IA-ACNC: <0.8 INDEX VALUE (ref 0–0.79)

## 2019-08-26 NOTE — TELEPHONE ENCOUNTER
Ms. Virginie Alcantar would like to know if Rik can take 4 meclazine a day instead of 3. The 3 is not taking her through 8 hours.  Please call Ms Virginie Alcantar

## 2019-09-04 ENCOUNTER — OFFICE VISIT (OUTPATIENT)
Dept: ONCOLOGY | Age: 34
End: 2019-09-04

## 2019-09-04 VITALS
DIASTOLIC BLOOD PRESSURE: 75 MMHG | SYSTOLIC BLOOD PRESSURE: 119 MMHG | OXYGEN SATURATION: 100 % | TEMPERATURE: 98.5 F | HEART RATE: 64 BPM

## 2019-09-04 DIAGNOSIS — R42 DIZZINESS: ICD-10-CM

## 2019-09-04 DIAGNOSIS — R79.0 LOW FERRITIN: ICD-10-CM

## 2019-09-04 DIAGNOSIS — R59.1 LYMPHADENOPATHY: Primary | ICD-10-CM

## 2019-09-04 RX ORDER — MECLIZINE HYDROCHLORIDE 25 MG/1
25 TABLET ORAL
Qty: 120 TAB | Refills: 2 | Status: SHIPPED | OUTPATIENT
Start: 2019-09-04 | End: 2019-10-04

## 2019-09-04 NOTE — PROGRESS NOTES
Cecilia Ott is a 30 y.o. 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     Mrs. Vicente Cook went to ER on 7/17/19 with c/o left sided lymphadenopathies. RF, ALEXANDREA and HIV were tested [facility].  Anti-double stranded DNA was positive but was not greater or equal to 10 international unit/mL. Her value was 5 which was indeterminate. CBC in March 2019 showed WBC 4.9, H&H 13.7/42.7, MCV 94, platelet 922. CMP was normal.  Treponema pallidum antibodies were negative. HIV screening was also negative.     Her paternal aunt had breast cancer. And paternal grandmother had pancreatic cancer.     Patient was seen and examined today. She is awake alert oriented x3. On review of systems she also says she has drenching night sweats. Hearing sounds in her ears. Also reports poor appetite and occasional nausea. Reports left-sided lymph node swelling. Denies any focal neurologic deficit. No bleeding. ECOG performance status 0. Independent with ADLs and IADLs.      DX: Lymphadenopathies    Past Medical History:   Diagnosis Date    Gastric ulcer     Gluten intolerance     Hashimoto's disease     Hyperthyroidism     Psychiatric disorder      Past Surgical History:   Procedure Laterality Date    HX GYN       Social History     Socioeconomic History    Marital status:      Spouse name: Not on file    Number of children: Not on file    Years of education: Not on file    Highest education level: Not on file   Tobacco Use    Smoking status: Current Every Day Smoker     Packs/day: 1.00    Smokeless tobacco: Never Used   Substance and Sexual Activity    Alcohol use: Not Currently     Comment: rarely    Drug use: Yes     Types: Marijuana    Sexual activity: Yes   Social History Narrative    ** Merged History Encounter **          No family history on file. Current Outpatient Medications   Medication Sig Dispense Refill    budesonide (ENTOCORT EC) 3 mg capsule TAKE THREE CAPSULES BY MOUTH DAILY FOR 4 WEEKS THEN TAKE TWO CAPSULES DAILY FOR 2 WEEKS THEN ONE CAPSULE DAILY FOR 2 WEEKS  0    levothyroxine (SYNTHROID) 25 mcg tablet TAKE 1 TABLET BY MOUTH ONCE DAILY  3    liothyronine (CYTOMEL) 5 mcg tablet TAKE 1 2 (ONE HALF) TABLET BY MOUTH TWICE DAILY  3    PARoxetine (PAXIL) 20 mg tablet TAKE 1 TABLET BY MOUTH ONCE DAILY IN THE MORNING FOR 90 DAYS  0    meclizine (ANTIVERT) 25 mg tablet Take 1 Tab by mouth three (3) times daily as needed for Dizziness or Nausea for up to 30 days. 90 Tab 1    ondansetron hcl (ZOFRAN) 4 mg tablet Take 1 Tab by mouth every eight (8) hours as needed for Nausea. 12 Tab 0    liothyronine (CYTOMEL) 5 mcg tablet Take 5 mcg by mouth daily.  levothyroxine (SYNTHROID) 25 mcg tablet Take  by mouth Daily (before breakfast). Allergies   Allergen Reactions    Percocet [Oxycodone-Acetaminophen] Other (comments)     seizure       Review of Systems    Denies any fever or chills. Remaining of  comprehensive review of systems otherwise was negative.     Objective:  Visit Vitals  Resp (P) 18   Wt (P) 48.8 kg (107 lb 9.6 oz)   BMI (P) 16.36 kg/m²       Physical Exam:   General appearance - alert, well appearing, and in no distress  Mental status - alert, oriented to person, place, and time  EYE-BEN, EOMI  ENT-ENT exam normal, no neck nodes or sinus tenderness  Mouth - mucous membranes moist, pharynx normal without lesions  Neck - supple, no significant adenopathy  Chest - clear to auscultation, no wheezes, rales or rhonchi, symmetric air entry   Heart - normal rate and regular rhythm   Abdomen - soft, nontender, nondistended, no masses or organomegaly  Lymph- no adenopathy palpable  Ext-no pedal edema noted  Skin-Warm and dry. Neuro -alert, oriented, normal speech, no focal findings or movement disorder noted      Diagnostic Imaging     No results found for this or any previous visit. Results for orders placed during the hospital encounter of 07/17/19   XR CHEST PA LAT    Narrative CHEST PA AND LATERAL, 7/17/2019    Technique:   Frontal and lateral chest (2) views obtained. Comparison prior chest,  5/31/2017. Findings: There is no appreciable interval change. The lungs are hyperinflated with flattening of the hemidiaphragms, more likely  to reflect chronic airways disease than effort related in this age group. The  lungs appear   clear. . No evident pneumothorax or consolidation. The costophrenic sulci   appear sharp. The cardiac silhouette and the mediastinum  appears unremarkable. The pulmonary  vascularity is within normal limits. Misc: Thin body habitus      Impression IMPRESSION:    1.  Stable chest series  since prior exam  5-2017. Hyperinflation. No  radiographic evidence of acute cardiopulmonary disease. Results for orders placed during the hospital encounter of 04/11/19   CT ABD PELV W CONT    Narrative EXAM: CT of the abdomen and pelvis    INDICATION: Postprandial epigastric abdominal pain, one month history of  constant left flank abdominal pain, vomiting for 2 months    COMPARISON: None. TECHNIQUE: Axial CT imaging of the abdomen and pelvis was performed with  intravenous contrast. Multiplanar reformats were generated. One or more dose  reduction techniques were used on this CT: automated exposure control,  adjustment of the mAs and/or kVp according to patient size, and iterative  reconstruction techniques. The specific techniques used on this CT exam have  been documented in the patient's electronic medical record.  Digital Imaging and  Communications in Medicine (DICOM) format image data are available to  nonaffiliated external healthcare facilities or entities on a secure, media  free, reciprocally searchable basis with patient authorization for at least a  12-month period after this study. _______________    FINDINGS:    LOWER CHEST: Unremarkable. LIVER, BILIARY: Liver is normal. No biliary dilation. Gallbladder is  unremarkable. PANCREAS: Within normal limits. SPLEEN: Normal.    ADRENALS: Normal.    KIDNEYS, URETERS, BLADDER: Symmetric enhancing bilateral kidneys with left extra  pelvic collecting system. No findings of hydronephrosis, hydroureter,  perinephric fluid or induration. No completely decompressed urinary bladder,  unremarkable and limits. GASTROINTESTINAL TRACT: No bowel dilation or large bowel wall thickening. Several prominent thick-walled nonoral contrast opacified loops of jejunum in  the left upper quadrant. Oral contrast is visualized distal to these small bowel  loops . Normal appendix. LYMPH NODES: No enlarged lymph nodes. PELVIC ORGANS: Anteverted uterus with IUD in place. Right adnexal partial  rim-enhancing 1 cm cystic structure, favoring functional ovarian etiology. Benign bilateral pelvic phleboliths. VASCULATURE: Normal caliber aorta. Mildly prominent left ovarian venous  structures, measuring top normal size which may be associated with pelvic  congestion syndrome    OSSEOUS: No acute or aggressive osseous abnormalities identified. OTHER: None.    _______________      Impression IMPRESSION:    1. Nonspecific thick wall appearing nonoral contrast opacified jejunal small  bowel loops in the left upper quadrant, without dilatation. Diagnostic  considerations may include nonspecific enteritis versus physiologic variant. The  remaining small bowel loops are normal in appearance. If symptomatology  persists, consider dedicated small bowel series as a follow-up.       2. Prominent left ovarian vasculature, which may be seen in physiologic variant  versus borderline pelvic congestion syndrome. 3. Right adnexal/ovarian rim-enhancing 1 cm structure, favoring functional  physiologic ovarian cyst /hemorrhagic cyst.    4. IUD in place. Lab Results  Lab Results   Component Value Date/Time    WBC 5.9 08/21/2019 02:20 PM    HGB 14.3 08/21/2019 02:20 PM    HCT 45.2 08/21/2019 02:20 PM    PLATELET 999 73/77/8664 02:20 PM    MCV 95.8 08/21/2019 02:20 PM       Lab Results   Component Value Date/Time    Sodium 135 (L) 08/21/2019 02:20 PM    Potassium 4.6 08/21/2019 02:20 PM    Chloride 105 08/21/2019 02:20 PM    CO2 28 08/21/2019 02:20 PM    Anion gap 2 (L) 08/21/2019 02:20 PM    Glucose 68 (L) 08/21/2019 02:20 PM    BUN 11 08/21/2019 02:20 PM    Creatinine 0.78 08/21/2019 02:20 PM    BUN/Creatinine ratio 14 08/21/2019 02:20 PM    GFR est AA >60 08/21/2019 02:20 PM    GFR est non-AA >60 08/21/2019 02:20 PM    Calcium 9.0 08/21/2019 02:20 PM    AST (SGOT) 15 08/21/2019 02:20 PM    Alk. phosphatase 65 08/21/2019 02:20 PM    Protein, total 7.6 08/21/2019 02:20 PM    Albumin 4.5 08/21/2019 02:20 PM    Globulin 3.1 08/21/2019 02:20 PM    A-G Ratio 1.5 08/21/2019 02:20 PM    ALT (SGPT) 37 08/21/2019 02:20 PM         Assessment/Plan:  30 y.o. female with  1. Lymphadenopathy  This is likely reactive from her autoimmune disorders. Patient was treated with Augmentin. I did not feel any clinically significant lymphadenopathies.      2. Autoimmune disease ([address]  She had Hashimoto's thyroiditis and is being followed by rheumatology and endocrinology. She also has gluten intolerance. On 8/21/2019, WBC 5.9, H&H 14.3/45.2, platelet 040. ALC 1.0. Normal B12 and folate. Transferrin saturation 26%, ferritin 30.     3. Lightheadedness: with tinnitus. Trial of Meclizine   Refer to neurology  Refer to ENT     4. Tobacco cessation:She stop smoking cigarette but smoking no herbal cigarette, with no nicotine.     RTC 3 months         Jaime Herrera MD

## 2019-09-04 NOTE — PROGRESS NOTES
Wallace Ohara is a 30 y.o. female presenting today for a follow-up appointment. Patient is ambulatory with no assistive devices and reports 5/10 pain in both ears, throat, and head. Chief Complaint   Patient presents with    Gland Swelling     Lymphadenopathy       Visit Vitals  /75   Pulse 64   Temp 98.5 °F (36.9 °C) (Oral)   Resp (P) 18   Wt (P) 107 lb 9.6 oz (48.8 kg)   SpO2 100%   BMI (P) 16.36 kg/m²       Current Outpatient Medications   Medication Sig    meclizine (ANTIVERT) 25 mg tablet Take 1 Tab by mouth four (4) times daily as needed for Dizziness or Nausea for up to 30 days.  budesonide (ENTOCORT EC) 3 mg capsule TAKE THREE CAPSULES BY MOUTH DAILY FOR 4 WEEKS THEN TAKE TWO CAPSULES DAILY FOR 2 WEEKS THEN ONE CAPSULE DAILY FOR 2 WEEKS    levothyroxine (SYNTHROID) 25 mcg tablet TAKE 1 TABLET BY MOUTH ONCE DAILY    liothyronine (CYTOMEL) 5 mcg tablet TAKE 1 2 (ONE HALF) TABLET BY MOUTH TWICE DAILY    PARoxetine (PAXIL) 20 mg tablet TAKE 1 TABLET BY MOUTH ONCE DAILY IN THE MORNING FOR 90 DAYS    ondansetron hcl (ZOFRAN) 4 mg tablet Take 1 Tab by mouth every eight (8) hours as needed for Nausea.  liothyronine (CYTOMEL) 5 mcg tablet Take 5 mcg by mouth daily.  levothyroxine (SYNTHROID) 25 mcg tablet Take  by mouth Daily (before breakfast). No current facility-administered medications for this visit. Medications no longer taking/discontinued: none    No flowsheet data found. 3 most recent PHQ Screens 8/21/2019   PHQ Not Done Active Diagnosis of Depression or Bipolar Disorder       No flowsheet data found. 1. Have you been to the ER, urgent care clinic since your last visit? Hospitalized since your last visit? No    2. Have you seen or consulted any other health care providers outside of the [address] [place] [address] since your last visit? Include any pap smears or colon screening.  No

## 2019-12-04 ENCOUNTER — OFFICE VISIT (OUTPATIENT)
Dept: ONCOLOGY | Age: 34
End: 2019-12-04

## 2019-12-04 ENCOUNTER — HOSPITAL ENCOUNTER (OUTPATIENT)
Dept: INFUSION THERAPY | Age: 34
Discharge: HOME | End: 2019-12-04
Payer: OTHER GOVERNMENT

## 2019-12-04 VITALS
BODY MASS INDEX: 16.18 KG/M2 | DIASTOLIC BLOOD PRESSURE: 69 MMHG | HEART RATE: 80 BPM | RESPIRATION RATE: 18 BRPM | TEMPERATURE: 99.2 F | OXYGEN SATURATION: 98 % | WEIGHT: 106.8 LBS | HEIGHT: 68 IN | SYSTOLIC BLOOD PRESSURE: 115 MMHG

## 2019-12-04 VITALS
SYSTOLIC BLOOD PRESSURE: 115 MMHG | OXYGEN SATURATION: 98 % | TEMPERATURE: 99.2 F | DIASTOLIC BLOOD PRESSURE: 69 MMHG | RESPIRATION RATE: 18 BRPM | HEART RATE: 80 BPM

## 2019-12-04 DIAGNOSIS — R42 DIZZINESS: ICD-10-CM

## 2019-12-04 DIAGNOSIS — R79.0 LOW FERRITIN: ICD-10-CM

## 2019-12-04 DIAGNOSIS — R59.1 LYMPHADENOPATHY: Primary | ICD-10-CM

## 2019-12-04 DIAGNOSIS — H93.19 TINNITUS, UNSPECIFIED LATERALITY: ICD-10-CM

## 2019-12-04 LAB
ALBUMIN SERPL-MCNC: 4.2 G/DL (ref 3.4–5)
ALBUMIN/GLOB SERPL: 1.2 {RATIO} (ref 0.8–1.7)
ALP SERPL-CCNC: 55 U/L (ref 45–117)
ALT SERPL-CCNC: 25 U/L (ref 13–56)
ANION GAP SERPL CALC-SCNC: 3 MMOL/L (ref 3–18)
AST SERPL-CCNC: 13 U/L (ref 10–38)
BASO+EOS+MONOS # BLD AUTO: 0.5 K/UL (ref 0–2.3)
BASO+EOS+MONOS NFR BLD AUTO: 9 % (ref 0.1–17)
BILIRUB SERPL-MCNC: 0.5 MG/DL (ref 0.2–1)
BUN SERPL-MCNC: 10 MG/DL (ref 7–18)
BUN/CREAT SERPL: 13 (ref 12–20)
CALCIUM SERPL-MCNC: 8.9 MG/DL (ref 8.5–10.1)
CHLORIDE SERPL-SCNC: 108 MMOL/L (ref 100–111)
CO2 SERPL-SCNC: 27 MMOL/L (ref 21–32)
CREAT SERPL-MCNC: 0.77 MG/DL (ref 0.6–1.3)
DIFFERENTIAL METHOD BLD: NORMAL
ERYTHROCYTE [DISTWIDTH] IN BLOOD BY AUTOMATED COUNT: 12.3 % (ref 11.5–14.5)
FERRITIN SERPL-MCNC: 47 NG/ML (ref 8–388)
GLOBULIN SER CALC-MCNC: 3.5 G/DL (ref 2–4)
GLUCOSE SERPL-MCNC: 69 MG/DL (ref 74–99)
HCT VFR BLD AUTO: 42.5 % (ref 36–48)
HGB BLD-MCNC: 13.9 G/DL (ref 12–16)
IRON SATN MFR SERPL: 25 %
IRON SERPL-MCNC: 79 UG/DL (ref 50–175)
LYMPHOCYTES # BLD: 1.2 K/UL (ref 1.1–5.9)
LYMPHOCYTES NFR BLD: 21 % (ref 14–44)
MCH RBC QN AUTO: 31 PG (ref 25–35)
MCHC RBC AUTO-ENTMCNC: 32.7 G/DL (ref 31–37)
MCV RBC AUTO: 94.9 FL (ref 78–102)
NEUTS SEG # BLD: 3.9 K/UL (ref 1.8–9.5)
NEUTS SEG NFR BLD: 70 % (ref 40–70)
PLATELET # BLD AUTO: 200 K/UL (ref 135–420)
POTASSIUM SERPL-SCNC: 4.4 MMOL/L (ref 3.5–5.5)
PROT SERPL-MCNC: 7.7 G/DL (ref 6.4–8.2)
RBC # BLD AUTO: 4.48 M/UL (ref 4.1–5.1)
SODIUM SERPL-SCNC: 138 MMOL/L (ref 136–145)
TIBC SERPL-MCNC: 318 UG/DL (ref 250–450)
WBC # BLD AUTO: 5.6 K/UL (ref 4.5–13)

## 2019-12-04 PROCEDURE — 36415 COLL VENOUS BLD VENIPUNCTURE: CPT

## 2019-12-04 PROCEDURE — 80053 COMPREHEN METABOLIC PANEL: CPT

## 2019-12-04 PROCEDURE — 85025 COMPLETE CBC W/AUTO DIFF WBC: CPT

## 2019-12-04 PROCEDURE — 82728 ASSAY OF FERRITIN: CPT

## 2019-12-04 PROCEDURE — 85049 AUTOMATED PLATELET COUNT: CPT

## 2019-12-04 PROCEDURE — 83540 ASSAY OF IRON: CPT

## 2019-12-04 NOTE — PROGRESS NOTES
Dayne Orellana is a 30 y.o. 1985 female and presents with No chief complaint on file. 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     Mrs. Jeremiah Paul went to ER on 7/17/19 with c/o left sided lymphadenopathies. RF, ALEXANDREA and HIV were tested I  [facility].  Anti-double stranded DNA was positive but was not greater or equal to 10 international unit/mL. Her value was 5 which was indeterminate. CBC in March 2019 showed WBC 4.9, H&H 13.7/42.7, MCV 94, platelet 565. CMP was normal.  Treponema pallidum antibodies were negative. HIV screening was also negative.     Her paternal aunt had breast cancer. And paternal grandmother had pancreatic cancer.     Patient was seen and examined today. She is awake alert oriented x3. On review of systems she also says she has drenching night sweats. Tinnitus still present, has not seen neurologist as of yet. Also reports poor appetite and occasional nausea. Reports left-sided lymph node swelling. Denies any focal neurologic deficit. No bleeding. ECOG performance status 0. Independent with ADLs and IADLs.      DX: Lymphadenopathies    Past Medical History:   Diagnosis Date    Gastric ulcer     Gluten intolerance     Hashimoto's disease     Hyperthyroidism     Psychiatric disorder      Past Surgical History:   Procedure Laterality Date    HX GYN       Social History     Socioeconomic History    Marital status:      Spouse name: Not on file    Number of children: Not on file    Years of education: Not on file    Highest education level: Not on file   Tobacco Use    Smoking status: Current Every Day Smoker     Packs/day: 1.00    Smokeless tobacco: Never Used   Substance and Sexual Activity    Alcohol use: Not Currently     Comment: rarely    Drug use: Yes     Types: Marijuana    Sexual activity: Yes   Social History Narrative    ** Merged History Encounter **          No family history on file. Current Outpatient Medications   Medication Sig Dispense Refill    budesonide (ENTOCORT EC) 3 mg capsule TAKE THREE CAPSULES BY MOUTH DAILY FOR 4 WEEKS THEN TAKE TWO CAPSULES DAILY FOR 2 WEEKS THEN ONE CAPSULE DAILY FOR 2 WEEKS  0    levothyroxine (SYNTHROID) 25 mcg tablet TAKE 1 TABLET BY MOUTH ONCE DAILY  3    liothyronine (CYTOMEL) 5 mcg tablet TAKE 1 2 (ONE HALF) TABLET BY MOUTH TWICE DAILY  3    PARoxetine (PAXIL) 20 mg tablet TAKE 1 TABLET BY MOUTH ONCE DAILY IN THE MORNING FOR 90 DAYS  0    ondansetron hcl (ZOFRAN) 4 mg tablet Take 1 Tab by mouth every eight (8) hours as needed for Nausea. 12 Tab 0    liothyronine (CYTOMEL) 5 mcg tablet Take 5 mcg by mouth daily.  levothyroxine (SYNTHROID) 25 mcg tablet Take  by mouth Daily (before breakfast). Allergies   Allergen Reactions    Percocet [Oxycodone-Acetaminophen] Other (comments)     seizure       Review of Systems    Denies any fever or chills. Remaining of  comprehensive review of systems otherwise was negative. Objective: There were no vitals taken for this visit. Physical Exam:   General appearance - alert, well appearing, and in no distress  Mental status - alert, oriented to person, place, and time  EYE-BEN, EOMI  ENT-ENT exam normal, no neck nodes or sinus tenderness  Mouth - mucous membranes moist, pharynx normal without lesions  Neck - supple, no significant adenopathy  Chest - clear to auscultation, no wheezes, rales or rhonchi, symmetric air entry   Heart - normal rate and regular rhythm   Abdomen - soft, nontender, nondistended, no masses or organomegaly  Lymph- no adenopathy palpable  Ext-no pedal edema noted  Skin-Warm and dry.    Neuro -alert, oriented, normal speech, no focal findings or movement disorder noted      Diagnostic Imaging     No results found for this or any previous visit. Results for orders placed during the hospital encounter of 07/17/19   XR CHEST PA LAT    Narrative CHEST PA AND LATERAL, 7/17/2019    Technique:   Frontal and lateral chest (2) views obtained. Comparison prior chest,  5/31/2017. Findings: There is no appreciable interval change. The lungs are hyperinflated with flattening of the hemidiaphragms, more likely  to reflect chronic airways disease than effort related in this age group. The  lungs appear   clear. . No evident pneumothorax or consolidation. The costophrenic sulci   appear sharp. The cardiac silhouette and the mediastinum  appears unremarkable. The pulmonary  vascularity is within normal limits. Misc: Thin body habitus      Impression IMPRESSION:    1.  Stable chest series  since prior exam  5-2017. Hyperinflation. No  radiographic evidence of acute cardiopulmonary disease. Results for orders placed during the hospital encounter of 04/11/19   CT ABD PELV W CONT    Narrative EXAM: CT of the abdomen and pelvis    INDICATION: Postprandial epigastric abdominal pain, one month history of  constant left flank abdominal pain, vomiting for 2 months    COMPARISON: None. TECHNIQUE: Axial CT imaging of the abdomen and pelvis was performed with  intravenous contrast. Multiplanar reformats were generated. One or more dose  reduction techniques were used on this CT: automated exposure control,  adjustment of the mAs and/or kVp according to patient size, and iterative  reconstruction techniques. The specific techniques used on this CT exam have  been documented in the patient's electronic medical record.  Digital Imaging and  Communications in Medicine (DICOM) format image data are available to  nonaffiliated external healthcare facilities or entities on a secure, media  free, reciprocally searchable basis with patient authorization for at least a  12-month period after this study.    _______________    FINDINGS:    LOWER CHEST: Unremarkable. LIVER, BILIARY: Liver is normal. No biliary dilation. Gallbladder is  unremarkable. PANCREAS: Within normal limits. SPLEEN: Normal.    ADRENALS: Normal.    KIDNEYS, URETERS, BLADDER: Symmetric enhancing bilateral kidneys with left extra  pelvic collecting system. No findings of hydronephrosis, hydroureter,  perinephric fluid or induration. No completely decompressed urinary bladder,  unremarkable and limits. GASTROINTESTINAL TRACT: No bowel dilation or large bowel wall thickening. Several prominent thick-walled nonoral contrast opacified loops of jejunum in  the left upper quadrant. Oral contrast is visualized distal to these small bowel  loops . Normal appendix. LYMPH NODES: No enlarged lymph nodes. PELVIC ORGANS: Anteverted uterus with IUD in place. Right adnexal partial  rim-enhancing 1 cm cystic structure, favoring functional ovarian etiology. Benign bilateral pelvic phleboliths. VASCULATURE: Normal caliber aorta. Mildly prominent left ovarian venous  structures, measuring top normal size which may be associated with pelvic  congestion syndrome    OSSEOUS: No acute or aggressive osseous abnormalities identified. OTHER: None.    _______________      Impression IMPRESSION:    1. Nonspecific thick wall appearing nonoral contrast opacified jejunal small  bowel loops in the left upper quadrant, without dilatation. Diagnostic  considerations may include nonspecific enteritis versus physiologic variant. The  remaining small bowel loops are normal in appearance. If symptomatology  persists, consider dedicated small bowel series as a follow-up. 2. Prominent left ovarian vasculature, which may be seen in physiologic variant  versus borderline pelvic congestion syndrome. 3. Right adnexal/ovarian rim-enhancing 1 cm structure, favoring functional  physiologic ovarian cyst /hemorrhagic cyst.    4. IUD in place.        Lab Results  Lab Results   Component Value Date/Time    WBC 5.9 08/21/2019 02:20 PM    HGB 14.3 08/21/2019 02:20 PM    HCT 45.2 08/21/2019 02:20 PM    PLATELET 473 52/42/1025 02:20 PM    MCV 95.8 08/21/2019 02:20 PM       Lab Results   Component Value Date/Time    Sodium 135 (L) 08/21/2019 02:20 PM    Potassium 4.6 08/21/2019 02:20 PM    Chloride 105 08/21/2019 02:20 PM    CO2 28 08/21/2019 02:20 PM    Anion gap 2 (L) 08/21/2019 02:20 PM    Glucose 68 (L) 08/21/2019 02:20 PM    BUN 11 08/21/2019 02:20 PM    Creatinine 0.78 08/21/2019 02:20 PM    BUN/Creatinine ratio 14 08/21/2019 02:20 PM    GFR est AA >60 08/21/2019 02:20 PM    GFR est non-AA >60 08/21/2019 02:20 PM    Calcium 9.0 08/21/2019 02:20 PM    AST (SGOT) 15 08/21/2019 02:20 PM    Alk. phosphatase 65 08/21/2019 02:20 PM    Protein, total 7.6 08/21/2019 02:20 PM    Albumin 4.5 08/21/2019 02:20 PM    Globulin 3.1 08/21/2019 02:20 PM    A-G Ratio 1.5 08/21/2019 02:20 PM    ALT (SGPT) 37 08/21/2019 02:20 PM         Assessment/Plan:  30 y.o. female with  1. Lymphadenopathy  This is likely reactive from her autoimmune disorders. Patient was treated with Augmentin. I did not feel any clinically significant lymphadenopathies.      2. Autoimmune disease ([facility] [address]  She had Hashimoto's thyroiditis and is being followed by rheumatology and endocrinology. She also has gluten intolerance. On 8/21/2019, WBC 5.9, H&H 14.3/45.2, platelet 571. ALC 1.0. Normal B12 and folate. Transferrin saturation 26%, ferritin 30. Patient failed to repeat ferritin and iron profile. Will order today and replace if needed.     3. Lightheadedness: with tinnitus. Trial of Meclizine was given Patient reports this medication works but wears off fast.  Refer to neurology, patient still waiting for appt.      4. Tobacco cessation:She stop smoking cigarettes but is now smoking \"herbal cigarettes\" with no nicotine.     Follow up PRN     Follow-up and Dispositions    · Return if symptoms worsen or fail to improve. I have reviewed the history, physical examination, assessment and the plan of the care of the patient. I saw the patient with Terrea Estimable, NP and I participated in the examination and critical decision making. I agree with the note as stated above.           Signed by: Shima Oreilly MD                     December 4, 2019        Shima Oreilly MD

## 2019-12-04 NOTE — PROGRESS NOTES
[facility] Progress Note    Date: 2019    Name: Cayetano Davey    MRN: [id number]         : 1985    Peripheral Lab Draw      Ms. Andrade to [facility], ambulatory at [phone] accompanied by self. Pt was assessed and education was provided. Ms. Antonio Uribe vitals were reviewed and patient was observed for 5 minutes prior to treatment. Visit Vitals  /69   Pulse 80   Temp 99.2 °F (37.3 °C) (Oral)   Resp 18   SpO2 98%     Recent Results (from the past 12 hour(s))   CBC WITH 3 PART DIFF    Collection Time: 19 11:55 AM   Result Value Ref Range    WBC 5.6 4.5 - 13.0 K/uL    RBC 4.48 4.10 - 5.10 M/uL    HGB 13.9 12.0 - 16.0 g/dL    HCT 42.5 36 - 48 %    MCV 94.9 78 - 102 FL    MCH 31.0 25.0 - 35.0 PG    MCHC 32.7 31 - 37 g/dL    RDW 12.3 11.5 - 14.5 %    NEUTROPHILS 70 40 - 70 %    MIXED CELLS 9 0.1 - 17 %    LYMPHOCYTES 21 14 - 44 %    ABS. NEUTROPHILS 3.9 1.8 - 9.5 K/UL    ABS. MIXED CELLS 0.5 0.0 - 2.3 K/uL    ABS. LYMPHOCYTES 1.2 1.1 - 5.9 K/UL    DF AUTOMATED         Blood obtained peripherally from left arm x 1 attempt with butterfly needle and sent to lab for Cbc w/diff, Cmp, Iron Profile and Ferritin per written orders. No bleeding or hematoma noted at site. Gauze and coban applied. Ms. Tonia Tijerina tolerated the phlebotomy, and had no complaints. Patient armband removed and shredded. Ms. Tonia Tijerina was discharged from [place] [address] in stable condition at 1155.      Randolph Scott Phlebotomist PCT  2019  11:56 AM

## 2020-02-25 ENCOUNTER — HOSPITAL ENCOUNTER (OUTPATIENT)
Dept: ULTRASOUND IMAGING | Age: 35
Discharge: HOME | End: 2020-02-25
Payer: OTHER GOVERNMENT

## 2020-02-25 DIAGNOSIS — R39.9 UTI SYMPTOMS: ICD-10-CM

## 2020-02-25 PROCEDURE — 76770 US EXAM ABDO BACK WALL COMP: CPT

## 2020-04-08 ENCOUNTER — HOSPITAL ENCOUNTER (OUTPATIENT)
Dept: LAB | Age: 35
Discharge: HOME | End: 2020-04-08
Payer: OTHER GOVERNMENT

## 2020-04-08 PROCEDURE — 88175 CYTOPATH C/V AUTO FLUID REDO: CPT

## 2020-04-08 PROCEDURE — 87624 HPV HI-RISK TYP POOLED RSLT: CPT

## 2020-08-11 ENCOUNTER — HOSPITAL ENCOUNTER (OUTPATIENT)
Dept: PHYSICAL THERAPY | Age: 35
Discharge: HOME | End: 2020-08-11
Payer: OTHER GOVERNMENT

## 2020-08-11 PROCEDURE — 97110 THERAPEUTIC EXERCISES: CPT

## 2020-08-11 PROCEDURE — 97162 PT EVAL MOD COMPLEX 30 MIN: CPT

## 2020-08-11 NOTE — PROGRESS NOTES
PHYSICAL THERAPY - DAILY TREATMENT NOTE    Patient Name: Vita Short        Date: 2020  : 1985   YES Patient  Verified  Visit #:   1   of   8  Insurance: Payor:  / Plan: [address] / Product Type:  /      In time: 9:10 Out time: 10:15   Total Treatment Time: 65     Medicare Time /BCBS Tracking (below)   Total Timed Codes (min):  na 1:1 Treatment Time:  na     TREATMENT AREA =  Back pain in pregnancy [O99.89, M54.9]  Pelvic pain in pregnancy [O26.899, R10.2]    SUBJECTIVE  Pain Level (on 0 to 10 scale):  3  / 10   Medication Changes/New allergies or changes in medical history, any new surgeries or procedures?     NO    If yes, update Summary List   Subjective Functional Status/Changes:  []  No changes reported     See POC          OBJECTIVE  Modalities Rationale:     decrease inflammation and decrease pain to improve patient's ability to stand for ADLs such as cooking   min [] Estim, type/location:                                      []  att     []  unatt     []  w/US     []  w/ice    []  w/heat    min []  Mechanical Traction: type/lbs                   []  pro   []  sup   []  int   []  cont    []  before manual    []  after manual    min []  Ultrasound, settings/location:      min []  Iontophoresis w/ dexamethasone, location:                                               []  take home patch       []  in clinic   10 min [x]  Ice     []  Heat    location/position: L/S, pubic in semireclined with wedge    min []  Vasopneumatic Device, press/temp:     min []  Other:    [] Skin assessment post-treatment (if applicable):    []  intact    []  redness- no adverse reaction     []redness  adverse reaction:        10 min Therapeutic Exercise:  [x]  See flow sheet   Rationale:      increase strength to improve the patients ability to perform household chores and grocery shop.     5 min Manual Therapy: MET right anterior ileal rotation   Rationale:      decrease pain and increase ROM to improve patient's ability to stand     min Therapeutic Activity:    Rationale:    na to improve the patients ability to na      15 min Patient Education:  YES  Reviewed HEP/POC/Goals   []  Progressed/Changed HEP based on:  Pt educated in spinal anatomy, function and dysfunction as related to diagnosis. Instructed in proper body mechanics for supine to sit. Other Objective/Functional Measures:    See POC     Post Treatment Pain Level (on 0 to 10) scale:   0  / 10     ASSESSMENT  Assessment/Changes in Function:     Justification for Eval Code Complexity:  Patient History : See POC  Examination see exam   Clinical Presentation: evolving  Clinical Decision Making : FOTO : 44 /100       []  See Progress Note/Recertification   Patient will continue to benefit from skilled PT services to modify and progress therapeutic interventions, address functional mobility deficits, address ROM deficits, address strength deficits, analyze and address soft tissue restrictions, analyze and cue movement patterns, analyze and modify body mechanics/ergonomics, assess and modify postural abnormalities and instruct in home and community integration to attain remaining goals. Progress toward goals / Updated goals:    Initial evaluation completed with home exercise program and education initiated.        PLAN  [x]  Upgrade activities as tolerated YES Continue plan of care   []  Discharge due to :    []  Other:      Therapist: Cory Rainey PT    Date: 8/11/2020 Time: 9:13 AM     Future Appointments   Date Time Provider Maikel Mendenhall   8/20/2020  3:30 PM Kofi Leach PT [facility]   8/27/2020  1:15 PM Kofi Leach PT [facility]   9/3/2020  9:30 AM Kofi Leach, PT [facility]   9/10/2020  9:30 AM Kofi Leach PT [facility]   9/17/2020  9:30 AM Kofi Leach PT [facility]   9/24/2020  9:30 AM Kofi Leach, PT [facility]   10/1/2020  9:30 AM Kofi Leach, PT [facility]

## 2020-08-11 NOTE — PROGRESS NOTES
[address] [place] [address] PHYSICAL THERAPY AT [facility] [address], [address] [facility], [address] - Phone: [phone]  Fax: [phone] / [phone] [facility]  Patient Name: Gamaliel Cervantes : 1985   Medical   Diagnosis: Back pain in pregnancy [O99.89, M54.9]  Pelvic pain in pregnancy [O26.899, R10.2] Treatment Diagnosis: Back pain in pregnancy [O99.89, M54.9]  Pelvic pain in pregnancy [O26.899, R10.2]   Onset Date: 2020     Referral Source: Kasandra Mccarty [facility]): 2020   Prior Hospitalization: See medical history Provider #: [id number]   Prior Level of Function: UI x 3 years, Chronic low back, hip pain since second delivery . Symptoms associated with black mold in her home including weight loss, fatigue and weakness for the past 3 years. Comorbidities: G6, P2   Medications: Verified on Patient Summary List   The Plan of Care and following information is based on the information from the initial evaluation.   ==================================================================================  Assessment / key information: Patient  is a 37 y.o. yo female who presents to [facility] with diagnosis of Back pain in pregnancy [O99.89, M54.9]  Pelvic pain in pregnancy [O26.899, R10.2]. Patient reports low back, hip and pubic pain which has been progressively worsening since 2012 birth, [place] with sneezing and coughing, urgency and straining with urinating and bowel movements  She is G6, P2 with vaginal deliveries with shoulder dystocia in the second delivery. She is presently 28 weeks pregnant. She has had some thinning of the cervix and was put on progesterone. Pain level is a constant 1/10 to 8/10 which increases with standing, walking, grocery shopping and household chores, decreases with lying down.       Upon objective evaluation, patient demonstrates SI hypomobility with right anterior ileal rotation,painful trunk AROM extension, decreased core strength and postural deviations. Patient scored 40 on FOTO indicating decreased functional status and quality of life. Patient would benefit from PT to address these deficits for increased functional mobility and quality of life. Gait :  WNL. Postural deviations: Right iliac crest higher . SI symmetry: Forward flexion + on right  Trunk AROM flexion WNL all planes with increased pain in extension. Manual muscle test: Psoas right 5/5, left 5/5. Gluteus medius right 5/5, left 5/5. Flexibility: Passive SLR right 90/90,  left 90/90. Palpation :Tender over right pube.    ==================================================================================  Eval Complexity: History: HIGH Complexity :3+ comorbidities / personal factors will impact the outcome/ POC Exam:HIGH Complexity : 4+ Standardized tests and measures addressing body structure, function, activity limitation and / or participation in recreation  Presentation: MEDIUM Complexity : Evolving with changing characteristics  Clinical Decision Making:MEDIUM Complexity : FOTO score of 26-74Overall Complexity:MEDIUM  Problem List: pain affecting function, decrease ADL/ functional abilitiies, decrease activity tolerance and decrease flexibility/ joint mobility   Treatment Plan may include any combination of the following: Therapeutic exercise, Therapeutic activities, Neuromuscular re-education, Physical agent/modality, Manual therapy, Patient education and Functional mobility training  Patient / Family readiness to learn indicated by: asking questions, trying to perform skills and interest  Persons(s) to be included in education: patient (P)  Barriers to Learning/Limitations: None  Measures taken:    Patient Goal (s): \"Strengthen muscle\"   Patient self reported health status: good  Rehabilitation Potential: good   Short Term Goals:  To be accomplished in 4  weeks:  1) Patient performing daily HEP. 2) Patient able to maintain SI mobility or self correct to increase ability to stand. 3) Patient will report 20% improvement in ability to stand for ADLs.  Long Term Goals: To be accomplished in 8 weeks:  1) Patient  independent  with HEP and able to demo proper body mechanics for lifting and carrying baby. 2) Patient will report 40% improvement in ability to stand for ADLs. 3) Increase FOTO to 58 indicating improved function and quality of life. Frequency / Duration:   Patient to be seen  1  times per week for 8  weeks:  Patient / Caregiver education and instruction: activity modification and exercises  G-Codes (GP): kaelyn    Therapist Signature: Cris Koo PT Date: 2/75/4061   Certification Period: na Time: 9:13 AM   ==================================================================================  I certify that the above Physical Therapy Services are being furnished while the patient is under my care. I agree with the treatment plan and certify that this therapy is necessary. Physician Signature:        Date:       Time:     Please sign and return to [facility] or you may fax the signed copy to [phone]. Thank you.

## 2020-08-20 ENCOUNTER — HOSPITAL ENCOUNTER (OUTPATIENT)
Dept: PHYSICAL THERAPY | Age: 35
Discharge: HOME | End: 2020-08-20
Payer: OTHER GOVERNMENT

## 2020-08-20 PROCEDURE — 97140 MANUAL THERAPY 1/> REGIONS: CPT

## 2020-08-20 PROCEDURE — 97530 THERAPEUTIC ACTIVITIES: CPT

## 2020-08-20 PROCEDURE — 97110 THERAPEUTIC EXERCISES: CPT

## 2020-08-20 NOTE — PROGRESS NOTES
PHYSICAL THERAPY - DAILY TREATMENT NOTE    Patient Name: Radha Hawkins        Date: 2020  : 1985   YES Patient  Verified  Visit #:   2   of   8  Insurance: Payor:  / Plan: [employer] [facility] / Product Type:  /      In time: 3:45 Out time: 4:25   Total Treatment Time: 40     Medicare Time /BCBS Tracking (below)   Total Timed Codes (min):  na 1:1 Treatment Time:  na     TREATMENT AREA =  Back pain in pregnancy [O99.89, M54.9]  Pelvic pain in pregnancy [O26.899, R10.2]    SUBJECTIVE  Pain Level (on 0 to 10 scale):  3-4  / 10   Medication Changes/New allergies or changes in medical history, any new surgeries or procedures? NO    If yes, update Summary List   Subjective Functional Status/Changes:  []  No changes reported     Patient reports that she has been doing her HEP 3x day.   Exercises have been helping, sleeping better and less pain when rising in the morning         OBJECTIVE  Modalities Rationale:     decrease inflammation and decrease pain to improve patient's ability to stand for ADLs such as cooking   min [] Estim, type/location:                                      []  att     []  unatt     []  w/US     []  w/ice    []  w/heat    min []  Mechanical Traction: type/lbs                   []  pro   []  sup   []  int   []  cont    []  before manual    []  after manual    min []  Ultrasound, settings/location:      min []  Iontophoresis w/ dexamethasone, location:                                               []  take home patch       []  in clinic   10 min [x]  Ice     []  Heat    location/position: L/S, pubic in semireclined with wedge    min []  Vasopneumatic Device, press/temp:     min []  Other:    [x] Skin assessment post-treatment (if applicable):    [x]  intact    []  redness- no adverse reaction     []redness  adverse reaction:        10 min Therapeutic Exercise:  [x]  See flow sheet   Rationale:      increase strength to improve the patients ability to perform household chores and grocery shop. 10 min Manual Therapy: MET left anterior ileal rotation with education in self correction. Rationale:      decrease pain and increase ROM to improve patient's ability to stand    10 min Therapeutic Activity: Supine to sit to supine, sleeping position for neutral spine. Education in the pelvic floor. Rationale:     to improve the patients ability to na       min Patient Education:  YES  Reviewed HEP   []  Progressed/Changed HEP based on: Added TA and fast contractions. Other Objective/Functional Measures:    + standing flexion, gillet on left     Post Treatment Pain Level (on 0 to 10) scale:   0  / 10     ASSESSMENT  Assessment/Changes in Function:     Left SI hypomobilty. Good response to pubic exercises with patient reporting decreased pain and increased sleeping ability. []  See Progress Note/Recertification   Patient will continue to benefit from skilled PT services to modify and progress therapeutic interventions, address functional mobility deficits, address ROM deficits, address strength deficits, analyze and address soft tissue restrictions, analyze and cue movement patterns, analyze and modify body mechanics/ergonomics, assess and modify postural abnormalities and instruct in home and community integration to attain remaining goals. Progress toward goals / Updated goals:    1) Patient performing daily HEP. Met  2) Patient able to maintain SI mobility or self correct to increase ability to stand. Progressing  3) Patient will report 20% improvement in ability to stand for ADLs.        PLAN  [x]  Upgrade activities as tolerated YES Continue plan of care   []  Discharge due to :    []  Other:      Therapist: Meera Tellez PT    Date: 8/20/2020 Time: 4:25 PM     Future Appointments   Date Time Provider Maikel Mendenhall   8/27/2020  1:15 PM Nadja Grace PT [facility]   9/3/2020  9:30 AM Nadja Grace PT [facility]   9/10/2020  9:30 AM Olayinka Ferrer Shital Jimenez, PT [facility]   9/17/2020  9:30 AM Mykel Aaron, PT [facility]   9/24/2020  9:30 AM Mykel Aaron, PT [facility]   10/1/2020  9:30 AM Mykel Aaron, PT [facility]

## 2020-08-27 ENCOUNTER — HOSPITAL ENCOUNTER (OUTPATIENT)
Dept: PHYSICAL THERAPY | Age: 35
Discharge: HOME | End: 2020-08-27
Payer: OTHER GOVERNMENT

## 2020-08-27 PROCEDURE — 97530 THERAPEUTIC ACTIVITIES: CPT

## 2020-08-27 PROCEDURE — 97110 THERAPEUTIC EXERCISES: CPT

## 2020-08-27 NOTE — PROGRESS NOTES
PHYSICAL THERAPY - DAILY TREATMENT NOTE    Patient Name: Freddie Ortega        Date: 2020  : 1985   YES Patient  Verified  Visit #:   3   of   8  Insurance: Payor:  / Plan: Frank [employer] 74 / Product Type:  /      In time: 1:20 Out time: 2:10   Total Treatment Time: 50     Medicare Time /BCBS Tracking (below)   Total Timed Codes (min):  na 1:1 Treatment Time:  na     TREATMENT AREA =  Back pain in pregnancy [O99.89, M54.9]  Pelvic pain in pregnancy [O26.899, R10.2]    SUBJECTIVE  Pain Level (on 0 to 10 scale): 2-3 10   Medication Changes/New allergies or changes in medical history, any new surgeries or procedures? NO    If yes, update Summary List   Subjective Functional Status/Changes:  []  No changes reported     Patient reports that she still has pain at the end of the day but overall pain better. Feels stronger and bladder holding more. Pregnancy is going well.          OBJECTIVE  Modalities Rationale:     decrease inflammation and decrease pain to improve patient's ability to stand for ADLs such as cooking   min [] Estim, type/location:                                      []  att     []  unatt     []  w/US     []  w/ice    []  w/heat    min []  Mechanical Traction: type/lbs                   []  pro   []  sup   []  int   []  cont    []  before manual    []  after manual    min []  Ultrasound, settings/location:      min []  Iontophoresis w/ dexamethasone, location:                                               []  take home patch       []  in clinic   10 min [x]  Ice     []  Heat    location/position: L/S, pubic in semireclined with wedge    min []  Vasopneumatic Device, press/temp:     min []  Other:    [x] Skin assessment post-treatment (if applicable):    [x]  intact    []  redness- no adverse reaction     []redness  adverse reaction:        30 min Therapeutic Exercise:  [x]  See flow sheet   Rationale:      increase strength to improve the patients ability to perform household chores and grocery shop. na min Manual Therapy:    Rationale:      decrease pain and increase ROM to improve patient's ability to stand    10 min Therapeutic Activity: Postural reeducation for maintaining PPT in sitting and standing. Rationale:     to improve the patients ability to na       min Patient Education:  YES  Reviewed HEP   []  Progressed/Changed HEP based on: Add BKFO, PPT in standing     Other Objective/Functional Measures:    + standing flexion, gillet on left     Post Treatment Pain Level (on 0 to 10) scale:   0/ 10     ASSESSMENT  Assessment/Changes in Function:     Left anterior ileal rotation resolved with PPT. Progressed to standing core strengthening. []  See Progress Note/Recertification   Patient will continue to benefit from skilled PT services to modify and progress therapeutic interventions, address functional mobility deficits, address ROM deficits, address strength deficits, analyze and address soft tissue restrictions, analyze and cue movement patterns, analyze and modify body mechanics/ergonomics, assess and modify postural abnormalities and instruct in home and community integration to attain remaining goals. Progress toward goals / Updated goals:    1) Patient performing daily HEP. Met  2) Patient able to maintain SI mobility or self correct to increase ability to stand. Progressing  3) Patient will report 20% improvement in ability to stand for ADLs.  Progressing       PLAN  [x]  Upgrade activities as tolerated YES Continue plan of care   []  Discharge due to :    []  Other:      Therapist: Jose Laurent PT    Date: 8/27/2020 Time: 2:10 PM     Future Appointments   Date Time Provider Maikel Mendenhall   9/3/2020  9:30 AM Jonas Silveira PT [facility]   9/10/2020  9:30 AM CHAPIN Swartz [facility]   9/17/2020  9:30 AM CHAPIN Swartz [facility]   9/24/2020  9:30 AM CHAPIN Swartz [facility]   10/8/2020  9:30 AM Jonas Silveira PT [facility]

## 2020-09-03 ENCOUNTER — APPOINTMENT (OUTPATIENT)
Dept: PHYSICAL THERAPY | Age: 35
End: 2020-09-03

## 2020-09-10 ENCOUNTER — APPOINTMENT (OUTPATIENT)
Dept: PHYSICAL THERAPY | Age: 35
End: 2020-09-10

## 2020-09-17 ENCOUNTER — APPOINTMENT (OUTPATIENT)
Dept: PHYSICAL THERAPY | Age: 35
End: 2020-09-17

## 2020-09-22 NOTE — PROGRESS NOTES
[facility], [address] [place] [address] [facility] [place] [address]  Phone: [phone]  Fax: [phone] SUMMARY  Patient Name: Lalit Quinn : 1985   Treatment/Medical Diagnosis: Back pain in pregnancy [O99.89, M54.9]  Pelvic pain in pregnancy [O26.899, R10.2]   Referral Source: River Norman CNM     Date of Initial Visit: 2020 Attended Visits: 3 Missed Visits: 1     SUMMARY OF TREATMENT  PT has consisted of exercises for pubic dysfunction, core strengthening, manual therapy, education as to decreasing pubic stress, ice and home exercise program.     CURRENT STATUS  Patient completed 3/8 treatments then stopped PT secondary to pre term labor. Recommended patient stop all PT exercises. When last seen 2020 she was making good progress towards goals. Goal/Measure of Progress Goal Met? 1. Patient performing daily HEP. Status at last Eval: na Current Status: Patient performing daily HEP yes   2. Patient able to maintain SI mobility or self correct to increase ability to stand. Status at last Eval: na Current Status: SI mobility maintained yes   3. Patient will report 20% improvement in ability to stand for ADLs. Status at last Eval: na Current Status: Pain decreased from 3/10 to 2-3/10 progressing     RECOMMENDATIONS  Other: D/C PT secondary to medical complication. .  If you have any questions/comments please contact us directly at [phone]. Thank you for allowing us to assist in the care of your patient. Therapist Signature:  Eron Gonsalves PT Date: 2020     Time: 11:38 AM

## 2020-09-24 ENCOUNTER — APPOINTMENT (OUTPATIENT)
Dept: PHYSICAL THERAPY | Age: 35
End: 2020-09-24

## 2020-10-01 ENCOUNTER — APPOINTMENT (OUTPATIENT)
Dept: PHYSICAL THERAPY | Age: 35
End: 2020-10-01

## 2020-10-08 ENCOUNTER — APPOINTMENT (OUTPATIENT)
Dept: PHYSICAL THERAPY | Age: 35
End: 2020-10-08

## 2023-02-15 NOTE — ED TRIAGE NOTES
SEVERAL DAYS OF VAGINAL DISCHARGE. USED otc MEDS. NOW VAGINA SORE. PINK SPOTTING.  URINARY PAIN AND SMELL SUBJECTIVE:  Doris is here for NEW OB visit. Transferring from Dr. Juan Daniel Toscano for midwifery care. Reports prior pregnancy she started with OB then transferred to midwifery care as well after \"everything was okay\". Last birth was a home birth in [place]. Not comfortable with home birth in [place] due to licensure differences between states and her prior previa. She reports fetal movement. She denies vaginal bleeding. She denies vaginal discharge. She denies leaking of fluid. She denies uterine contraction activity. She denies nausea and/or vomiting. She denies retaining fluid in her extremities. She is feeling well, no concerns today. Was sent Diflucan on 2/13/23 for yeast infection symptoms. Did not take the Diflucan and is not reporting symptoms at this time. OBJECTIVE:  Blood pressure 114/70, pulse 80, weight 180 lb (81.6 kg), last menstrual period 07/28/2022. Pregravid BMI: Could not be calculated   TWG: Not found. Doris has not the Tdap vaccine as appropriate, declined  Doris has not had the COVID-19 vaccine  Declines flu shot     ASSESSMENT/PLAN:  IUP @ 28w6d  Doris will monitor fetal movement daily. 28 week lab panel was discussed Glucose 72 Hgb 11.1  RhoGam not indicated. Given 2nd trimester packet (pitocin, vitamin K/erythromycin, Tdap vaccine info, Nitrous oxide)  S = D  High risk pregnancy, AMA  - On ASA  - NIPT negative    Family hx of spina bifida  - Half sister, had surgery at 9yo  - Low risk panorama  - normal anatomy scan    Hx IUGR in previous pregnancy   - 2/1/23 EFW 2lb 2oz 49%, previa resolved  - Follow up growth scan at 28 weeks - OK to switch to ultrasound in office, order placed     RESOLVED - placenta previa  Posterior placenta > 3 cm from cervical os    Fhx DVT  Mother hx DVT, no personal hx   No anticoagulation indicated   MFM ordered labs - negative      The problem list was reviewed and updated with any new issues from today's visit.     Return in about 2 weeks (around 3/1/2023) for OB visit with Midwives (growth U/S at following visit). The patient, Keara Carrillo, was seen with a total time spent of 35 minutes for the visit on this date of service by the [facility]  The time component involved both face-to-face (counseling and education) and non face-to-face time (care coordination), spent in determining the total time component.       Seen with Crystal WEBSTER

## 2023-03-09 ENCOUNTER — HOSPITAL ENCOUNTER (OUTPATIENT)
Facility: HOSPITAL | Age: 38
Setting detail: SPECIMEN
Discharge: HOME | End: 2023-03-09
Payer: OTHER GOVERNMENT

## 2023-03-09 PROCEDURE — 87624 HPV HI-RISK TYP POOLED RSLT: CPT

## 2023-03-09 PROCEDURE — 88175 CYTOPATH C/V AUTO FLUID REDO: CPT

## (undated) DEVICE — TUBING SET BERK 6FT LF DISP -- GYRUS

## (undated) DEVICE — DEPAUL MINOR LITHOTOMY CDS: Brand: MEDLINE INDUSTRIES, INC.

## (undated) DEVICE — SOL IRR NACL 0.9% 500ML POUR --

## (undated) DEVICE — CONTROL SYRINGE LUER-LOCK TIP: Brand: MONOJECT

## (undated) DEVICE — KENDALL SCD EXPRESS SLEEVES, KNEE LENGTH, MEDIUM: Brand: KENDALL SCD

## (undated) DEVICE — Z DISCONTINUED USE 2659133 CANNULA VAC DIA8MM CRV SEMI RIG W/ ROUNDED TIP TAPR END

## (undated) DEVICE — NDL SPNE QNCKE 22GX5IN LF BLK --

## (undated) DEVICE — STERILE POLYISOPRENE POWDER-FREE SURGICAL GLOVES: Brand: PROTEXIS

## (undated) DEVICE — TELFA NON-ADHERENT PADS PREPAK: Brand: TELFA